# Patient Record
Sex: MALE | Race: WHITE | Employment: OTHER | URBAN - NONMETROPOLITAN AREA
[De-identification: names, ages, dates, MRNs, and addresses within clinical notes are randomized per-mention and may not be internally consistent; named-entity substitution may affect disease eponyms.]

---

## 2021-09-01 ENCOUNTER — ANESTHESIA EVENT (OUTPATIENT)
Dept: ENDOSCOPY | Age: 76
DRG: 872 | End: 2021-09-01
Payer: MEDICARE

## 2021-09-01 ENCOUNTER — APPOINTMENT (OUTPATIENT)
Dept: GENERAL RADIOLOGY | Age: 76
DRG: 872 | End: 2021-09-01
Attending: HOSPITALIST
Payer: MEDICARE

## 2021-09-01 ENCOUNTER — HOSPITAL ENCOUNTER (INPATIENT)
Age: 76
LOS: 2 days | Discharge: HOME | DRG: 872 | End: 2021-09-03
Attending: HOSPITALIST | Admitting: FAMILY MEDICINE
Payer: MEDICARE

## 2021-09-01 ENCOUNTER — ANESTHESIA (OUTPATIENT)
Dept: ENDOSCOPY | Age: 76
DRG: 872 | End: 2021-09-01
Payer: MEDICARE

## 2021-09-01 VITALS — OXYGEN SATURATION: 90 % | DIASTOLIC BLOOD PRESSURE: 73 MMHG | SYSTOLIC BLOOD PRESSURE: 142 MMHG | TEMPERATURE: 98 F

## 2021-09-01 PROBLEM — K83.1 COMMON BILE DUCT (CBD) OBSTRUCTION: Status: ACTIVE | Noted: 2021-09-01

## 2021-09-01 LAB
ALBUMIN SERPL-MCNC: 3.9 G/DL (ref 3.5–5.2)
ALP BLD-CCNC: 134 U/L (ref 40–130)
ALT SERPL-CCNC: 455 U/L (ref 5–41)
ANION GAP SERPL CALCULATED.3IONS-SCNC: 14 MMOL/L (ref 7–19)
AST SERPL-CCNC: 404 U/L (ref 5–40)
BILIRUB SERPL-MCNC: 7.1 MG/DL (ref 0.2–1.2)
BUN BLDV-MCNC: 16 MG/DL (ref 8–23)
CALCIUM SERPL-MCNC: 9.4 MG/DL (ref 8.8–10.2)
CHLORIDE BLD-SCNC: 101 MMOL/L (ref 98–111)
CO2: 25 MMOL/L (ref 22–29)
CREAT SERPL-MCNC: 0.9 MG/DL (ref 0.5–1.2)
GFR AFRICAN AMERICAN: >59
GFR NON-AFRICAN AMERICAN: >60
GLUCOSE BLD-MCNC: 110 MG/DL (ref 74–109)
HCT VFR BLD CALC: 45.1 % (ref 42–52)
HEMOGLOBIN: 14.6 G/DL (ref 14–18)
INR BLD: 1.28 (ref 0.88–1.18)
LACTIC ACID: 2.2 MMOL/L (ref 0.5–1.9)
LV EF: 65 %
LVEF MODALITY: NORMAL
MCH RBC QN AUTO: 28 PG (ref 27–31)
MCHC RBC AUTO-ENTMCNC: 32.4 G/DL (ref 33–37)
MCV RBC AUTO: 86.6 FL (ref 80–94)
PDW BLD-RTO: 15.1 % (ref 11.5–14.5)
PLATELET # BLD: 136 K/UL (ref 130–400)
PMV BLD AUTO: 11.4 FL (ref 9.4–12.4)
POTASSIUM SERPL-SCNC: 4.1 MMOL/L (ref 3.5–5)
PRO-BNP: 1927 PG/ML (ref 0–1800)
PROTHROMBIN TIME: 16.1 SEC (ref 12–14.6)
RBC # BLD: 5.21 M/UL (ref 4.7–6.1)
SODIUM BLD-SCNC: 140 MMOL/L (ref 136–145)
TOTAL PROTEIN: 6.8 G/DL (ref 6.6–8.7)
TSH SERPL DL<=0.05 MIU/L-ACNC: 2.03 UIU/ML (ref 0.27–4.2)
WBC # BLD: 20.1 K/UL (ref 4.8–10.8)

## 2021-09-01 PROCEDURE — 6360000002 HC RX W HCPCS: Performed by: FAMILY MEDICINE

## 2021-09-01 PROCEDURE — 87040 BLOOD CULTURE FOR BACTERIA: CPT

## 2021-09-01 PROCEDURE — 6360000002 HC RX W HCPCS: Performed by: NURSE ANESTHETIST, CERTIFIED REGISTERED

## 2021-09-01 PROCEDURE — 2580000003 HC RX 258: Performed by: INTERNAL MEDICINE

## 2021-09-01 PROCEDURE — 3700000001 HC ADD 15 MINUTES (ANESTHESIA): Performed by: INTERNAL MEDICINE

## 2021-09-01 PROCEDURE — 85027 COMPLETE CBC AUTOMATED: CPT

## 2021-09-01 PROCEDURE — 6360000004 HC RX CONTRAST MEDICATION: Performed by: FAMILY MEDICINE

## 2021-09-01 PROCEDURE — 99254 IP/OBS CNSLTJ NEW/EST MOD 60: CPT | Performed by: INTERNAL MEDICINE

## 2021-09-01 PROCEDURE — C1769 GUIDE WIRE: HCPCS | Performed by: INTERNAL MEDICINE

## 2021-09-01 PROCEDURE — 2580000003 HC RX 258: Performed by: NURSE ANESTHETIST, CERTIFIED REGISTERED

## 2021-09-01 PROCEDURE — 2709999900 HC NON-CHARGEABLE SUPPLY: Performed by: INTERNAL MEDICINE

## 2021-09-01 PROCEDURE — 6360000002 HC RX W HCPCS: Performed by: INTERNAL MEDICINE

## 2021-09-01 PROCEDURE — 2580000003 HC RX 258: Performed by: FAMILY MEDICINE

## 2021-09-01 PROCEDURE — 84443 ASSAY THYROID STIM HORMONE: CPT

## 2021-09-01 PROCEDURE — 3609015200 HC ERCP REMOVE CALCULI/DEBRIS BILIARY/PANCREAS DUCT: Performed by: INTERNAL MEDICINE

## 2021-09-01 PROCEDURE — 83880 ASSAY OF NATRIURETIC PEPTIDE: CPT

## 2021-09-01 PROCEDURE — 85610 PROTHROMBIN TIME: CPT

## 2021-09-01 PROCEDURE — 6370000000 HC RX 637 (ALT 250 FOR IP): Performed by: INTERNAL MEDICINE

## 2021-09-01 PROCEDURE — 2500000003 HC RX 250 WO HCPCS: Performed by: NURSE ANESTHETIST, CERTIFIED REGISTERED

## 2021-09-01 PROCEDURE — 2720000010 HC SURG SUPPLY STERILE: Performed by: INTERNAL MEDICINE

## 2021-09-01 PROCEDURE — 0F7D8DZ DILATION OF PANCREATIC DUCT WITH INTRALUMINAL DEVICE, VIA NATURAL OR ARTIFICIAL OPENING ENDOSCOPIC: ICD-10-PCS | Performed by: INTERNAL MEDICINE

## 2021-09-01 PROCEDURE — 7100000001 HC PACU RECOVERY - ADDTL 15 MIN: Performed by: INTERNAL MEDICINE

## 2021-09-01 PROCEDURE — 0F798DZ DILATION OF COMMON BILE DUCT WITH INTRALUMINAL DEVICE, VIA NATURAL OR ARTIFICIAL OPENING ENDOSCOPIC: ICD-10-PCS | Performed by: INTERNAL MEDICINE

## 2021-09-01 PROCEDURE — 7100000000 HC PACU RECOVERY - FIRST 15 MIN: Performed by: INTERNAL MEDICINE

## 2021-09-01 PROCEDURE — 3700000000 HC ANESTHESIA ATTENDED CARE: Performed by: INTERNAL MEDICINE

## 2021-09-01 PROCEDURE — 74328 X-RAY BILE DUCT ENDOSCOPY: CPT

## 2021-09-01 PROCEDURE — 83605 ASSAY OF LACTIC ACID: CPT

## 2021-09-01 PROCEDURE — 2500000003 HC RX 250 WO HCPCS: Performed by: FAMILY MEDICINE

## 2021-09-01 PROCEDURE — C8929 TTE W OR WO FOL WCON,DOPPLER: HCPCS

## 2021-09-01 PROCEDURE — 36415 COLL VENOUS BLD VENIPUNCTURE: CPT

## 2021-09-01 PROCEDURE — 80053 COMPREHEN METABOLIC PANEL: CPT

## 2021-09-01 PROCEDURE — 2140000000 HC CCU INTERMEDIATE R&B

## 2021-09-01 PROCEDURE — 0FC98ZZ EXTIRPATION OF MATTER FROM COMMON BILE DUCT, VIA NATURAL OR ARTIFICIAL OPENING ENDOSCOPIC: ICD-10-PCS | Performed by: INTERNAL MEDICINE

## 2021-09-01 PROCEDURE — C2625 STENT, NON-COR, TEM W/DEL SY: HCPCS | Performed by: INTERNAL MEDICINE

## 2021-09-01 DEVICE — PANCREATIC STENT KIT
Type: IMPLANTABLE DEVICE | Site: PANCREAS | Status: FUNCTIONAL
Brand: ADVANIX™ PANCREATIC STENT KIT

## 2021-09-01 DEVICE — BILIARY STENT WITH NAVIFLEXTM RX DELIVERY SYSTEM
Type: IMPLANTABLE DEVICE | Site: BILE DUCT | Status: FUNCTIONAL
Brand: ADVANIX™ BILIARY

## 2021-09-01 RX ORDER — SODIUM CHLORIDE 0.9 % (FLUSH) 0.9 %
10 SYRINGE (ML) INJECTION EVERY 12 HOURS SCHEDULED
Status: DISCONTINUED | OUTPATIENT
Start: 2021-09-01 | End: 2021-09-03 | Stop reason: HOSPADM

## 2021-09-01 RX ORDER — MEPERIDINE HYDROCHLORIDE 25 MG/ML
12.5 INJECTION INTRAMUSCULAR; INTRAVENOUS; SUBCUTANEOUS EVERY 5 MIN PRN
Status: DISCONTINUED | OUTPATIENT
Start: 2021-09-01 | End: 2021-09-01

## 2021-09-01 RX ORDER — ONDANSETRON 2 MG/ML
INJECTION INTRAMUSCULAR; INTRAVENOUS PRN
Status: DISCONTINUED | OUTPATIENT
Start: 2021-09-01 | End: 2021-09-01 | Stop reason: SDUPTHER

## 2021-09-01 RX ORDER — HYDROMORPHONE HYDROCHLORIDE 1 MG/ML
1 INJECTION, SOLUTION INTRAMUSCULAR; INTRAVENOUS; SUBCUTANEOUS EVERY 4 HOURS PRN
Status: DISCONTINUED | OUTPATIENT
Start: 2021-09-01 | End: 2021-09-03 | Stop reason: HOSPADM

## 2021-09-01 RX ORDER — IBUPROFEN 400 MG/1
400 TABLET ORAL EVERY 6 HOURS PRN
Status: DISCONTINUED | OUTPATIENT
Start: 2021-09-01 | End: 2021-09-03 | Stop reason: HOSPADM

## 2021-09-01 RX ORDER — DILTIAZEM HYDROCHLORIDE 240 MG/1
240 CAPSULE, COATED, EXTENDED RELEASE ORAL DAILY
COMMUNITY

## 2021-09-01 RX ORDER — ROCURONIUM BROMIDE 10 MG/ML
INJECTION, SOLUTION INTRAVENOUS PRN
Status: DISCONTINUED | OUTPATIENT
Start: 2021-09-01 | End: 2021-09-01 | Stop reason: SDUPTHER

## 2021-09-01 RX ORDER — POLYETHYLENE GLYCOL 3350 17 G/17G
17 POWDER, FOR SOLUTION ORAL DAILY PRN
Status: DISCONTINUED | OUTPATIENT
Start: 2021-09-01 | End: 2021-09-03 | Stop reason: HOSPADM

## 2021-09-01 RX ORDER — LOSARTAN POTASSIUM 50 MG/1
50 TABLET ORAL DAILY
COMMUNITY

## 2021-09-01 RX ORDER — MAGNESIUM SULFATE IN WATER 40 MG/ML
2000 INJECTION, SOLUTION INTRAVENOUS PRN
Status: DISCONTINUED | OUTPATIENT
Start: 2021-09-01 | End: 2021-09-03 | Stop reason: HOSPADM

## 2021-09-01 RX ORDER — ACETAMINOPHEN 325 MG/1
650 TABLET ORAL EVERY 6 HOURS PRN
Status: DISCONTINUED | OUTPATIENT
Start: 2021-09-01 | End: 2021-09-01

## 2021-09-01 RX ORDER — POTASSIUM CHLORIDE 7.45 MG/ML
10 INJECTION INTRAVENOUS PRN
Status: DISCONTINUED | OUTPATIENT
Start: 2021-09-01 | End: 2021-09-03 | Stop reason: HOSPADM

## 2021-09-01 RX ORDER — ACETAMINOPHEN 650 MG/1
650 SUPPOSITORY RECTAL EVERY 6 HOURS PRN
Status: DISCONTINUED | OUTPATIENT
Start: 2021-09-01 | End: 2021-09-01

## 2021-09-01 RX ORDER — HYDROMORPHONE HYDROCHLORIDE 1 MG/ML
0.5 INJECTION, SOLUTION INTRAMUSCULAR; INTRAVENOUS; SUBCUTANEOUS EVERY 5 MIN PRN
Status: DISCONTINUED | OUTPATIENT
Start: 2021-09-01 | End: 2021-09-01

## 2021-09-01 RX ORDER — OMEPRAZOLE 20 MG/1
40 CAPSULE, DELAYED RELEASE ORAL DAILY
COMMUNITY

## 2021-09-01 RX ORDER — SODIUM CHLORIDE 0.9 % (FLUSH) 0.9 %
5-40 SYRINGE (ML) INJECTION EVERY 12 HOURS SCHEDULED
Status: DISCONTINUED | OUTPATIENT
Start: 2021-09-01 | End: 2021-09-01

## 2021-09-01 RX ORDER — DEXAMETHASONE SODIUM PHOSPHATE 10 MG/ML
INJECTION, SOLUTION INTRAMUSCULAR; INTRAVENOUS PRN
Status: DISCONTINUED | OUTPATIENT
Start: 2021-09-01 | End: 2021-09-01 | Stop reason: SDUPTHER

## 2021-09-01 RX ORDER — LABETALOL HYDROCHLORIDE 5 MG/ML
5 INJECTION, SOLUTION INTRAVENOUS EVERY 10 MIN PRN
Status: DISCONTINUED | OUTPATIENT
Start: 2021-09-01 | End: 2021-09-01

## 2021-09-01 RX ORDER — METOCLOPRAMIDE HYDROCHLORIDE 5 MG/ML
10 INJECTION INTRAMUSCULAR; INTRAVENOUS
Status: DISCONTINUED | OUTPATIENT
Start: 2021-09-01 | End: 2021-09-01

## 2021-09-01 RX ORDER — SODIUM CHLORIDE 0.9 % (FLUSH) 0.9 %
5-40 SYRINGE (ML) INJECTION PRN
Status: DISCONTINUED | OUTPATIENT
Start: 2021-09-01 | End: 2021-09-01

## 2021-09-01 RX ORDER — ONDANSETRON 4 MG/1
4 TABLET, ORALLY DISINTEGRATING ORAL EVERY 8 HOURS PRN
Status: DISCONTINUED | OUTPATIENT
Start: 2021-09-01 | End: 2021-09-03 | Stop reason: HOSPADM

## 2021-09-01 RX ORDER — MECOBALAMIN 5000 MCG
5 TABLET,DISINTEGRATING ORAL NIGHTLY
Status: DISCONTINUED | OUTPATIENT
Start: 2021-09-01 | End: 2021-09-03 | Stop reason: HOSPADM

## 2021-09-01 RX ORDER — LIDOCAINE HYDROCHLORIDE 10 MG/ML
1 INJECTION, SOLUTION EPIDURAL; INFILTRATION; INTRACAUDAL; PERINEURAL
Status: DISCONTINUED | OUTPATIENT
Start: 2021-09-01 | End: 2021-09-01

## 2021-09-01 RX ORDER — SODIUM CHLORIDE 9 MG/ML
25 INJECTION, SOLUTION INTRAVENOUS PRN
Status: DISCONTINUED | OUTPATIENT
Start: 2021-09-01 | End: 2021-09-03 | Stop reason: HOSPADM

## 2021-09-01 RX ORDER — LIDOCAINE HYDROCHLORIDE 10 MG/ML
INJECTION, SOLUTION EPIDURAL; INFILTRATION; INTRACAUDAL; PERINEURAL PRN
Status: DISCONTINUED | OUTPATIENT
Start: 2021-09-01 | End: 2021-09-01 | Stop reason: SDUPTHER

## 2021-09-01 RX ORDER — MORPHINE SULFATE 4 MG/ML
4 INJECTION, SOLUTION INTRAMUSCULAR; INTRAVENOUS
Status: DISCONTINUED | OUTPATIENT
Start: 2021-09-01 | End: 2021-09-01

## 2021-09-01 RX ORDER — SODIUM CHLORIDE 9 MG/ML
INJECTION, SOLUTION INTRAVENOUS CONTINUOUS
Status: ACTIVE | OUTPATIENT
Start: 2021-09-01 | End: 2021-09-01

## 2021-09-01 RX ORDER — MORPHINE SULFATE 4 MG/ML
4 INJECTION, SOLUTION INTRAMUSCULAR; INTRAVENOUS EVERY 5 MIN PRN
Status: DISCONTINUED | OUTPATIENT
Start: 2021-09-01 | End: 2021-09-01

## 2021-09-01 RX ORDER — SODIUM CHLORIDE 0.9 % (FLUSH) 0.9 %
10 SYRINGE (ML) INJECTION PRN
Status: DISCONTINUED | OUTPATIENT
Start: 2021-09-01 | End: 2021-09-03 | Stop reason: HOSPADM

## 2021-09-01 RX ORDER — PANTOPRAZOLE SODIUM 40 MG/10ML
40 INJECTION, POWDER, LYOPHILIZED, FOR SOLUTION INTRAVENOUS DAILY
Status: DISCONTINUED | OUTPATIENT
Start: 2021-09-01 | End: 2021-09-03 | Stop reason: HOSPADM

## 2021-09-01 RX ORDER — MIDAZOLAM HYDROCHLORIDE 1 MG/ML
2 INJECTION INTRAMUSCULAR; INTRAVENOUS
Status: DISCONTINUED | OUTPATIENT
Start: 2021-09-01 | End: 2021-09-01

## 2021-09-01 RX ORDER — PROPOFOL 10 MG/ML
INJECTION, EMULSION INTRAVENOUS PRN
Status: DISCONTINUED | OUTPATIENT
Start: 2021-09-01 | End: 2021-09-01 | Stop reason: SDUPTHER

## 2021-09-01 RX ORDER — POTASSIUM CHLORIDE 20 MEQ/1
40 TABLET, EXTENDED RELEASE ORAL PRN
Status: DISCONTINUED | OUTPATIENT
Start: 2021-09-01 | End: 2021-09-03 | Stop reason: HOSPADM

## 2021-09-01 RX ORDER — FENTANYL CITRATE 50 UG/ML
INJECTION, SOLUTION INTRAMUSCULAR; INTRAVENOUS PRN
Status: DISCONTINUED | OUTPATIENT
Start: 2021-09-01 | End: 2021-09-01 | Stop reason: SDUPTHER

## 2021-09-01 RX ORDER — PROMETHAZINE HYDROCHLORIDE 25 MG/ML
6.25 INJECTION, SOLUTION INTRAMUSCULAR; INTRAVENOUS
Status: DISCONTINUED | OUTPATIENT
Start: 2021-09-01 | End: 2021-09-01

## 2021-09-01 RX ORDER — SODIUM CHLORIDE, SODIUM LACTATE, POTASSIUM CHLORIDE, CALCIUM CHLORIDE 600; 310; 30; 20 MG/100ML; MG/100ML; MG/100ML; MG/100ML
INJECTION, SOLUTION INTRAVENOUS CONTINUOUS
Status: DISCONTINUED | OUTPATIENT
Start: 2021-09-01 | End: 2021-09-01

## 2021-09-01 RX ORDER — M-VIT,TX,IRON,MINS/CALC/FOLIC 27MG-0.4MG
1 TABLET ORAL DAILY
COMMUNITY

## 2021-09-01 RX ORDER — SODIUM CHLORIDE 0.9 % (FLUSH) 0.9 %
5-40 SYRINGE (ML) INJECTION EVERY 12 HOURS SCHEDULED
Status: DISCONTINUED | OUTPATIENT
Start: 2021-09-01 | End: 2021-09-03 | Stop reason: HOSPADM

## 2021-09-01 RX ORDER — DIPHENHYDRAMINE HYDROCHLORIDE 50 MG/ML
12.5 INJECTION INTRAMUSCULAR; INTRAVENOUS
Status: DISCONTINUED | OUTPATIENT
Start: 2021-09-01 | End: 2021-09-01

## 2021-09-01 RX ORDER — SCOLOPAMINE TRANSDERMAL SYSTEM 1 MG/1
1 PATCH, EXTENDED RELEASE TRANSDERMAL ONCE
Status: DISCONTINUED | OUTPATIENT
Start: 2021-09-01 | End: 2021-09-01

## 2021-09-01 RX ORDER — COLCHICINE 0.6 MG/1
0.6 TABLET ORAL DAILY PRN
COMMUNITY

## 2021-09-01 RX ORDER — ALLOPURINOL 100 MG/1
100 TABLET ORAL DAILY
COMMUNITY

## 2021-09-01 RX ORDER — ONDANSETRON 2 MG/ML
4 INJECTION INTRAMUSCULAR; INTRAVENOUS EVERY 6 HOURS PRN
Status: DISCONTINUED | OUTPATIENT
Start: 2021-09-01 | End: 2021-09-03 | Stop reason: HOSPADM

## 2021-09-01 RX ORDER — SODIUM CHLORIDE 9 MG/ML
10 INJECTION INTRAVENOUS DAILY
Status: DISCONTINUED | OUTPATIENT
Start: 2021-09-01 | End: 2021-09-03 | Stop reason: HOSPADM

## 2021-09-01 RX ORDER — DILTIAZEM HYDROCHLORIDE 5 MG/ML
10 INJECTION INTRAVENOUS ONCE
Status: COMPLETED | OUTPATIENT
Start: 2021-09-01 | End: 2021-09-01

## 2021-09-01 RX ORDER — HYDRALAZINE HYDROCHLORIDE 20 MG/ML
5 INJECTION INTRAMUSCULAR; INTRAVENOUS EVERY 10 MIN PRN
Status: DISCONTINUED | OUTPATIENT
Start: 2021-09-01 | End: 2021-09-01

## 2021-09-01 RX ORDER — SODIUM CHLORIDE, SODIUM LACTATE, POTASSIUM CHLORIDE, CALCIUM CHLORIDE 600; 310; 30; 20 MG/100ML; MG/100ML; MG/100ML; MG/100ML
INJECTION, SOLUTION INTRAVENOUS CONTINUOUS PRN
Status: DISCONTINUED | OUTPATIENT
Start: 2021-09-01 | End: 2021-09-01 | Stop reason: SDUPTHER

## 2021-09-01 RX ORDER — FENTANYL CITRATE 50 UG/ML
50 INJECTION, SOLUTION INTRAMUSCULAR; INTRAVENOUS
Status: DISCONTINUED | OUTPATIENT
Start: 2021-09-01 | End: 2021-09-01

## 2021-09-01 RX ORDER — SODIUM CHLORIDE 0.9 % (FLUSH) 0.9 %
5-40 SYRINGE (ML) INJECTION PRN
Status: DISCONTINUED | OUTPATIENT
Start: 2021-09-01 | End: 2021-09-03 | Stop reason: HOSPADM

## 2021-09-01 RX ORDER — SUCCINYLCHOLINE CHLORIDE 20 MG/ML
INJECTION INTRAMUSCULAR; INTRAVENOUS PRN
Status: DISCONTINUED | OUTPATIENT
Start: 2021-09-01 | End: 2021-09-01 | Stop reason: SDUPTHER

## 2021-09-01 RX ORDER — MORPHINE SULFATE 4 MG/ML
2 INJECTION, SOLUTION INTRAMUSCULAR; INTRAVENOUS EVERY 5 MIN PRN
Status: DISCONTINUED | OUTPATIENT
Start: 2021-09-01 | End: 2021-09-01

## 2021-09-01 RX ORDER — HYDROMORPHONE HYDROCHLORIDE 1 MG/ML
0.25 INJECTION, SOLUTION INTRAMUSCULAR; INTRAVENOUS; SUBCUTANEOUS EVERY 5 MIN PRN
Status: DISCONTINUED | OUTPATIENT
Start: 2021-09-01 | End: 2021-09-01

## 2021-09-01 RX ORDER — SODIUM CHLORIDE 9 MG/ML
25 INJECTION, SOLUTION INTRAVENOUS PRN
Status: DISCONTINUED | OUTPATIENT
Start: 2021-09-01 | End: 2021-09-01

## 2021-09-01 RX ADMIN — GLUCAGON HYDROCHLORIDE 1 MG: 1 INJECTION, POWDER, FOR SOLUTION INTRAMUSCULAR; INTRAVENOUS; SUBCUTANEOUS at 17:12

## 2021-09-01 RX ADMIN — PHENYLEPHRINE HYDROCHLORIDE 100 MCG: 10 INJECTION INTRAVENOUS at 17:19

## 2021-09-01 RX ADMIN — PIPERACILLIN AND TAZOBACTAM 3375 MG: 3; .375 INJECTION, POWDER, LYOPHILIZED, FOR SOLUTION INTRAVENOUS at 22:45

## 2021-09-01 RX ADMIN — PHENYLEPHRINE HYDROCHLORIDE 100 MCG: 10 INJECTION INTRAVENOUS at 17:35

## 2021-09-01 RX ADMIN — PHENYLEPHRINE HYDROCHLORIDE 100 MCG: 10 INJECTION INTRAVENOUS at 17:42

## 2021-09-01 RX ADMIN — PROPOFOL 180 MG: 10 INJECTION, EMULSION INTRAVENOUS at 16:47

## 2021-09-01 RX ADMIN — SUCCINYLCHOLINE CHLORIDE 120 MG: 20 INJECTION, SOLUTION INTRAMUSCULAR; INTRAVENOUS at 16:48

## 2021-09-01 RX ADMIN — ONDANSETRON HYDROCHLORIDE 4 MG: 2 INJECTION, SOLUTION INTRAMUSCULAR; INTRAVENOUS at 17:20

## 2021-09-01 RX ADMIN — FENTANYL CITRATE 100 MCG: 50 INJECTION INTRAMUSCULAR; INTRAVENOUS at 17:15

## 2021-09-01 RX ADMIN — LIDOCAINE HYDROCHLORIDE 50 MG: 10 INJECTION, SOLUTION EPIDURAL; INFILTRATION; INTRACAUDAL; PERINEURAL at 16:46

## 2021-09-01 RX ADMIN — DILTIAZEM HYDROCHLORIDE 10 MG: 5 INJECTION INTRAVENOUS at 11:42

## 2021-09-01 RX ADMIN — FENTANYL CITRATE 100 MCG: 50 INJECTION INTRAMUSCULAR; INTRAVENOUS at 16:46

## 2021-09-01 RX ADMIN — PHENYLEPHRINE HYDROCHLORIDE 100 MCG: 10 INJECTION INTRAVENOUS at 17:32

## 2021-09-01 RX ADMIN — PHENYLEPHRINE HYDROCHLORIDE 100 MCG: 10 INJECTION INTRAVENOUS at 17:02

## 2021-09-01 RX ADMIN — PHENYLEPHRINE HYDROCHLORIDE 100 MCG: 10 INJECTION INTRAVENOUS at 17:04

## 2021-09-01 RX ADMIN — PHENYLEPHRINE HYDROCHLORIDE 100 MCG: 10 INJECTION INTRAVENOUS at 17:39

## 2021-09-01 RX ADMIN — DILTIAZEM HYDROCHLORIDE 10 MG/HR: 5 INJECTION INTRAVENOUS at 11:45

## 2021-09-01 RX ADMIN — SUGAMMADEX 200 MG: 100 INJECTION, SOLUTION INTRAVENOUS at 18:37

## 2021-09-01 RX ADMIN — GLUCAGON HYDROCHLORIDE 1 MG: 1 INJECTION, POWDER, FOR SOLUTION INTRAMUSCULAR; INTRAVENOUS; SUBCUTANEOUS at 17:27

## 2021-09-01 RX ADMIN — ROCURONIUM BROMIDE 20 MG: 10 INJECTION, SOLUTION INTRAVENOUS at 17:37

## 2021-09-01 RX ADMIN — PERFLUTREN 1.65 MG: 6.52 INJECTION, SUSPENSION INTRAVENOUS at 15:00

## 2021-09-01 RX ADMIN — PHENYLEPHRINE HYDROCHLORIDE 150 MCG/MIN: 10 INJECTION INTRAVENOUS at 17:46

## 2021-09-01 RX ADMIN — DEXAMETHASONE SODIUM PHOSPHATE 4 MG: 10 INJECTION, SOLUTION INTRAMUSCULAR; INTRAVENOUS at 16:55

## 2021-09-01 RX ADMIN — SODIUM CHLORIDE, SODIUM LACTATE, POTASSIUM CHLORIDE, AND CALCIUM CHLORIDE: 600; 310; 30; 20 INJECTION, SOLUTION INTRAVENOUS at 17:54

## 2021-09-01 RX ADMIN — SODIUM CHLORIDE, PRESERVATIVE FREE 10 ML: 5 INJECTION INTRAVENOUS at 22:45

## 2021-09-01 RX ADMIN — Medication 5 MG: at 22:44

## 2021-09-01 RX ADMIN — SODIUM CHLORIDE, SODIUM LACTATE, POTASSIUM CHLORIDE, AND CALCIUM CHLORIDE: 600; 310; 30; 20 INJECTION, SOLUTION INTRAVENOUS at 16:42

## 2021-09-01 RX ADMIN — ENOXAPARIN SODIUM 40 MG: 40 INJECTION SUBCUTANEOUS at 11:43

## 2021-09-01 RX ADMIN — PHENYLEPHRINE HYDROCHLORIDE 100 MCG: 10 INJECTION INTRAVENOUS at 17:41

## 2021-09-01 ASSESSMENT — ENCOUNTER SYMPTOMS
DIARRHEA: 0
TROUBLE SWALLOWING: 0
COUGH: 0
WHEEZING: 0
SORE THROAT: 0
VOMITING: 0
EYES NEGATIVE: 1
ABDOMINAL PAIN: 1
COLOR CHANGE: 0
CHOKING: 0
SHORTNESS OF BREATH: 0
NAUSEA: 1
CONSTIPATION: 0
SHORTNESS OF BREATH: 0
ALLERGIC/IMMUNOLOGIC NEGATIVE: 1
NAUSEA: 0

## 2021-09-01 ASSESSMENT — LIFESTYLE VARIABLES: SMOKING_STATUS: 0

## 2021-09-01 NOTE — H&P
HISTORY AND PHYSICAL             Date: 9/2/2021        Patient Name: Mickey Garcia     YOB: 1945      Age:  67 y.o. Chief Complaint   Abdominal pain, transfer from [facility]       History Obtained From   patient, electronic medical record    History of Present Illness   Patient is a 69-year-old  male with a known past medical history of atrial fibrillation, essential hypertension, gastroesophageal reflux disease, gout transferred from [facility] to PCU due to abdominal discomfort, CT abdomen and pelvis with abnormal dilation of her common bile duct worrisome for biliary obstruction as well as atrial fibrillation with rapid ventricular rate. Patient seen in PCU bed 729, in no acute distress, feels as though his abdomen is less bloated. States initial onset of pain occurred while working at [employer] after a meal which consisted of bologna and cheese. Patient denies any prior episodes of this pain. Labs at [facility] revealed white count of 16.8, with some bands elevation. Metabolic profile revealing , , alkaline phosphatase 143, total bilirubin 2.7. At current time patient denies any headache, change in vision, chest pain, fevers, diarrhea. Patient does complain of slight nauseousness. Covid screen was noted to be - 9/1/2021      Of note CT abdomen and pelvis report as well as CD accompany patient in chart. CT abdomen and pelvis -abnormal dilation of the common bile duct with mild intrahepatic bile duct dilation worrisome for biliary obstruction. Extrahepatic bile duct abnormality dilated to 1.3 cm. Recommending contrast-enhanced MRCP  Prostate mildly large, evidence of diverticulosis without diverticulitis. Gallbladdernormal in appearance.     Past Medical History     Past Medical History:   Diagnosis Date    Arthritis     Chronic kidney disease     COPD (chronic obstructive pulmonary disease) ([facility] [address]     GERD (gastroesophageal Gastrointestinal: Positive for abdominal pain and nausea. Negative for diarrhea and vomiting. Genitourinary: Negative for difficulty urinating. Musculoskeletal: Negative for arthralgias. Skin: Negative for color change and pallor. Neurological: Negative for weakness, light-headedness and headaches. Psychiatric/Behavioral: Negative for agitation and confusion. Physical Exam   /76   Pulse 96   Temp 97.7 °F (36.5 °C) (Temporal)   Resp 18   Ht 5' 10\" (1.778 m)   Wt 198 lb 4 oz (89.9 kg)   SpO2 98%   BMI 28.45 kg/m²     Physical Exam  Vitals and nursing note reviewed. Constitutional:       General: He is not in acute distress. Appearance: He is obese. HENT:      Head: Normocephalic and atraumatic. Nose: Nose normal.   Eyes:      Conjunctiva/sclera: Conjunctivae normal.   Cardiovascular:      Rate and Rhythm: Tachycardia present. Rhythm irregularly irregular. Pulses: Normal pulses. Pulmonary:      Effort: No respiratory distress. Breath sounds: No wheezing. Abdominal:      General: Bowel sounds are normal. There is distension. Tenderness: There is abdominal tenderness. There is no guarding or rebound. Musculoskeletal:      Cervical back: Normal range of motion. Right lower leg: No edema. Left lower leg: No edema. Skin:     General: Skin is warm. Neurological:      Mental Status: He is alert and oriented to person, place, and time. Mental status is at baseline.    Psychiatric:         Mood and Affect: Mood normal.         Labs      Recent Results (from the past 24 hour(s))   TSH without Reflex    Collection Time: 09/01/21 11:26 AM   Result Value Ref Range    TSH 2.030 0.270 - 4.200 uIU/mL   Protime-INR    Collection Time: 09/01/21 11:26 AM   Result Value Ref Range    Protime 16.1 (H) 12.0 - 14.6 sec    INR 1.28 (H) 0.88 - 1.18   CBC    Collection Time: 09/01/21 11:26 AM   Result Value Ref Range    WBC 20.1 (H) 4.8 - 10.8 K/uL    RBC 5.21 4.70 - 6.10 M/uL    Hemoglobin 14.6 14.0 - 18.0 g/dL    Hematocrit 45.1 42.0 - 52.0 %    MCV 86.6 80.0 - 94.0 fL    MCH 28.0 27.0 - 31.0 pg    MCHC 32.4 (L) 33.0 - 37.0 g/dL    RDW 15.1 (H) 11.5 - 14.5 %    Platelets 396 354 - 565 K/uL    MPV 11.4 9.4 - 12.4 fL   Comprehensive Metabolic Panel    Collection Time: 09/01/21 11:26 AM   Result Value Ref Range    Sodium 140 136 - 145 mmol/L    Potassium 4.1 3.5 - 5.0 mmol/L    Chloride 101 98 - 111 mmol/L    CO2 25 22 - 29 mmol/L    Anion Gap 14 7 - 19 mmol/L    Glucose 110 (H) 74 - 109 mg/dL    BUN 16 8 - 23 mg/dL    CREATININE 0.9 0.5 - 1.2 mg/dL    GFR Non-African American >60 >60    GFR African American >59 >59    Calcium 9.4 8.8 - 10.2 mg/dL    Total Protein 6.8 6.6 - 8.7 g/dL    Albumin 3.9 3.5 - 5.2 g/dL    Total Bilirubin 7.1 (H) 0.2 - 1.2 mg/dL    Alkaline Phosphatase 134 (H) 40 - 130 U/L     (H) 5 - 41 U/L     (H) 5 - 40 U/L   PROBNP, N-TERMINAL    Collection Time: 09/01/21 11:26 AM   Result Value Ref Range    Pro-BNP 1,927 (H) 0 - 1,800 pg/mL   Lactic Acid, Plasma    Collection Time: 09/01/21  1:20 PM   Result Value Ref Range    Lactic Acid 2.2 (HH) 0.5 - 1.9 mmol/L   Lactic Acid, Plasma    Collection Time: 09/02/21  2:07 AM   Result Value Ref Range    Lactic Acid 1.6 0.5 - 1.9 mmol/L   CBC    Collection Time: 09/02/21  2:07 AM   Result Value Ref Range    WBC 16.8 (H) 4.8 - 10.8 K/uL    RBC 4.71 4.70 - 6.10 M/uL    Hemoglobin 13.1 (L) 14.0 - 18.0 g/dL    Hematocrit 40.8 (L) 42.0 - 52.0 %    MCV 86.6 80.0 - 94.0 fL    MCH 27.8 27.0 - 31.0 pg    MCHC 32.1 (L) 33.0 - 37.0 g/dL    RDW 15.4 (H) 11.5 - 14.5 %    Platelets 232 (L) 126 - 400 K/uL    MPV 11.9 9.4 - 12.4 fL   Comprehensive Metabolic Panel    Collection Time: 09/02/21  2:07 AM   Result Value Ref Range    Sodium 135 (L) 136 - 145 mmol/L    Potassium 4.2 3.5 - 5.0 mmol/L    Chloride 102 98 - 111 mmol/L    CO2 22 22 - 29 mmol/L    Anion Gap 11 7 - 19 mmol/L    Glucose 170 (H) 74 - 109 mg/dL    BUN 21 8 - 23 mg/dL    CREATININE 1.3 (H) 0.5 - 1.2 mg/dL    GFR Non-African American 54 (A) >60    GFR African American >59 >59    Calcium 8.8 8.8 - 10.2 mg/dL    Total Protein 5.6 (L) 6.6 - 8.7 g/dL    Albumin 3.5 3.5 - 5.2 g/dL    Total Bilirubin 4.4 (H) 0.2 - 1.2 mg/dL    Alkaline Phosphatase 116 40 - 130 U/L     (H) 5 - 41 U/L     (H) 5 - 40 U/L        Imaging/Diagnostics Last 24 Hours   No results found. Assessment      Hospital Problems         Last Modified POA    Common bile duct (CBD) obstruction 9/1/2021 Yes          Plan     Sepsis due to CBD Obstruction   -Noted elevation of lactic acid, tachycardia, elevated WBC count  -Suspected common bile duct pathology  -Patient placed n.p.o., GI for possible intervention today  -Trend lactic acid, continue Zosyn antibiotics, blood cultures in process    Abdominal pain/common bile duct obstruction  -Transferred from outlying facility, noted abnormal CT abdomen and pelvis  -Elevated LFTs, bilirubin  -Continue with pain control modalities, antiemetics as needed  -Protonix  -Currently n.p.o., GI has been consulted appreciate recommendations      Atrial Fibrillation with rapid ventricular rate  -Continues on Cardizem rate controlled   -Anticoagulation currently held in anticipation of surgery. - Will continue to monitor on telemetry   -TSH in process  -Transthoracic echocardiogram ordered    Gastroesophageal reflux disease  -Continue PPI    Essential hypertension  -Chronic condition, continue antihypertensive regimen, routine vitals            Consultations Ordered:  IP CONSULT TO GI        EMR Dragon/Transcription disclaimer:   Much of this encounter note is an electronic transcription/translation of spoken language to printed text.  The electronic translation of spoken language may permit erroneous, or at times, nonsensical words or phrases to be inadvertently transcribed; although attempts have made to review the note for such errors, some may still exist.    Electronically signed by   Yoanna Bennett MD   Internal Medicine Hospitalist  On 9/2/2021  At 8:24 AM

## 2021-09-01 NOTE — PROGRESS NOTES
Pt now to echo on stretcher with transport. Pt is alert and oriented, very [employer] [place] [employer] [place].  Electronically signed by Chris Cam RN on 9/1/2021 at 2:27 PM

## 2021-09-01 NOTE — CONSULTS
Consult Note            Date:9/1/2021        Patient Name:Jeff Zamudio     YOB: 1945     Age:75 y.o. Inpatient consult to GI  Consult performed by: Abiel Martin MD  Consult ordered by: Chichi Daniels MD  Reason for consult: BIliary Obstruction          Chief Complaint   No chief complaint on file. History Obtained From   Patient and EMR    History of Present Illness   Mr. Mimi Guillaume is a pleasant 68 yo male who had acute onset of RUQ pain that started about 20 minutes after eating lunch yesterday. It was severe in nature and patient denies having had nausea or vomiting. He presented to his local ED due to the persistance and severity of pain and workup was remarkable for elevated WBC 15K, elevated AST and ALT with a total bilirubin of 2.7. CT scan was obtained and did show a dilated CBD of 13 mm with cut off at the level of the ampulla but no discrete stone was seen. Patient also noted to be in A fib with RVR which he does have history of but has not been on anticoaguation. He was transferred for further evaluation and possible ERCP. Patient today does feel like his pain is improved however he does have significant abdominal tenderness. His WBC count now is up to 20K with worsening of ,  Alt 455, and bilirubin now at 7.1. Patient currently on a Cardizem gtt for control of heart rate. He was started on IV antibiotics at the outside ED. Patient denies any prior history of abdominal pian or history of gallstones. He does take Omeprazole for GERD which has been stable.      Past Medical History     Past Medical History:   Diagnosis Date    Arthritis     Chronic kidney disease     COPD (chronic obstructive pulmonary disease) ([facility] [address]     GERD (gastroesophageal reflux disease)     Hypertension     Pneumonia         Past Surgical History     Past Surgical History:   Procedure Laterality Date    APPENDECTOMY      TONSILLECTOMY          Medications     Prior to Admission medications Medication Sig Start Date End Date Taking? Authorizing Provider   allopurinol (ZYLOPRIM) 100 MG tablet Take 100 mg by mouth daily   Yes Historical Provider, MD   losartan (COZAAR) 50 MG tablet Take 50 mg by mouth daily   Yes Historical Provider, MD   dilTIAZem (CARDIZEM CD) 240 MG extended release capsule Take 240 mg by mouth daily   Yes Historical Provider, MD   omeprazole (PRILOSEC) 20 MG delayed release capsule Take 40 mg by mouth daily   Yes Historical Provider, MD   colchicine (COLCRYS) 0.6 MG tablet Take 0.6 mg by mouth daily as needed for Pain   Yes Historical Provider, MD   Multiple Vitamins-Minerals (THERAPEUTIC MULTIVITAMIN-MINERALS) tablet Take 1 tablet by mouth daily   Yes Historical Provider, MD        sodium chloride flush 0.9 % injection 5-40 mL, 2 times per day  sodium chloride flush 0.9 % injection 5-40 mL, PRN  0.9 % sodium chloride infusion, PRN  dilTIAZem 125 mg in dextrose 5 % 125 mL infusion, Continuous  sodium chloride flush 0.9 % injection 5-40 mL, 2 times per day  sodium chloride flush 0.9 % injection 5-40 mL, PRN  0.9 % sodium chloride infusion, PRN  enoxaparin (LOVENOX) injection 40 mg, Daily  ondansetron (ZOFRAN-ODT) disintegrating tablet 4 mg, Q8H PRN   Or  ondansetron (ZOFRAN) injection 4 mg, Q6H PRN  polyethylene glycol (GLYCOLAX) packet 17 g, Daily PRN  acetaminophen (TYLENOL) tablet 650 mg, Q6H PRN   Or  acetaminophen (TYLENOL) suppository 650 mg, Q6H PRN  potassium chloride (KLOR-CON M) extended release tablet 40 mEq, PRN   Or  potassium bicarb-citric acid (EFFER-K) effervescent tablet 40 mEq, PRN   Or  potassium chloride 10 mEq/100 mL IVPB (Peripheral Line), PRN  magnesium sulfate 2000 mg in 50 mL IVPB premix, PRN  piperacillin-tazobactam (ZOSYN) 3,375 mg in dextrose 5 % 50 mL IVPB extended infusion (mini-bag), Q8H        Allergies   Patient has no known allergies.     Social History     Social History     Tobacco History     Smoking Status  Former Smoker Smoking Tobacco Type  Cigarettes    Smokeless Tobacco Use  Never Used    Tobacco Comment  quit smoking 30 yrs ago          Alcohol History     Alcohol Use Status  Not Currently          Drug Use     Drug Use Status  Not Currently          Sexual Activity     Sexually Active  Not Currently                Family History     Family History   Problem Relation Age of Onset    Cancer Mother     No Known Problems Sister        Review of Systems   Review of Systems   Constitutional: Negative for activity change, appetite change and unexpected weight change. HENT: Positive for hearing loss. Negative for trouble swallowing. Eyes: Negative. Respiratory: Negative for cough, choking and shortness of breath. Cardiovascular: Negative for chest pain and leg swelling. Gastrointestinal: Positive for abdominal pain. Negative for constipation, diarrhea, nausea and vomiting. Endocrine: Negative. Genitourinary: Negative for dysuria, flank pain and hematuria. Musculoskeletal: Positive for arthralgias. Allergic/Immunologic: Negative. Neurological: Negative for dizziness, speech difficulty and weakness. Hematological: Negative. Psychiatric/Behavioral: Negative for agitation and confusion. Physical Exam   BP (!) 140/87   Pulse 126   Temp 97 °F (36.1 °C) (Temporal)   Resp 20   Ht 5' 10\" (1.778 m)   Wt 198 lb 4 oz (89.9 kg)   SpO2 96%   BMI 28.45 kg/m²      Physical Exam  Vitals reviewed. Constitutional:       General: He is not in acute distress. HENT:      Nose: Nose normal.      Mouth/Throat:      Mouth: Mucous membranes are moist.   Eyes:      General: Scleral icterus present. Pupils: Pupils are equal, round, and reactive to light. Cardiovascular:      Rate and Rhythm: Tachycardia present. Pulses: Normal pulses. Pulmonary:      Effort: Pulmonary effort is normal.      Breath sounds: Normal breath sounds. Abdominal:      General: Abdomen is flat.  Bowel sounds are normal.      Palpations: Abdomen is soft. Tenderness: There is abdominal tenderness in the right upper quadrant. Musculoskeletal:         General: Normal range of motion. Cervical back: Normal range of motion and neck supple. Skin:     General: Skin is warm and dry. Neurological:      General: No focal deficit present. Mental Status: He is alert. Psychiatric:         Mood and Affect: Mood normal.         Behavior: Behavior normal.         Labs    CBC:  Recent Labs     09/01/21  1126   WBC 20.1*   RBC 5.21   HGB 14.6   HCT 45.1   MCV 86.6   RDW 15.1*        CHEMISTRIES:  Recent Labs     09/01/21  1126      K 4.1      CO2 25   BUN 16   CREATININE 0.9   GLUCOSE 110*     PT/INR:  Recent Labs     09/01/21  1126   PROTIME 16.1*   INR 1.28*     APTT:No results for input(s): APTT in the last 72 hours. LIVER PROFILE:  Recent Labs     09/01/21  1126   *   *   BILITOT 7.1*   ALKPHOS 134*       Imaging/Diagnostics   No results found. Assessment      Hospital Problems         Last Modified POA    Common bile duct (CBD) obstruction 9/1/2021 Yes      Patient with acute onset RUQ abdominal pain after eating lunch yesterday now with worsening WBC count, liver enzymes as well as bilirubin and CT scan highly suggestive of obstruction in the distal common bile duct. He did receive antibiotics in the outside ED however his worsening numbers are concerning for acute cholangitis. Will proceed with ERCP today. He will likely need cholecystectomy at some point if in fact he does have cholelithiasis as culprit for his presentation. Plan   1. Supportive care with IVFs, antiemetics and analgesics as needed. 2.  IV antibiotics. 3. Proceed with ERCP today. 4.  Further recommendations to follow results of ERCP.       Electronically signed by Elenita Payne MD on 9/1/21 at 1:08 PM CDT

## 2021-09-01 NOTE — PROGRESS NOTES
Pt returned from Echo and now being transported by stretcher to Endo. Pt remains on Cardizem gtt at 10mg/hr.  Hr remains afib 120's -130's Electronically signed by Shawn Ruiz RN on 9/1/2021 at 3:33 PM

## 2021-09-01 NOTE — BRIEF OP NOTE
Brief Postoperative Note      Patient: Reynaldo Pedro  YOB: 1945  MRN: [id number]    Date of Procedure: 9/1/2021    Pre-Op Diagnosis: Biliary obstruction    Post-Op Diagnosis: Same       Procedure(s):  ERCP STONE REMOVAL w/STENT INSERTION   ERCP with pancreatic stent insertion     Surgeon(s):  Paolo Roldan MD    Assistant:  * No surgical staff found *    Anesthesia: General    Estimated Blood Loss (mL): Minimal    Complications: None    Specimens:   * No specimens in log *    Implants:  Implant Name Type Inv.  Item Serial No.  Lot No. LRB No. Used Action   STENT PANCREAS 5FR L5CM PGTL LD ATILIO ADVANIX  STENT PANCREAS 5FR L5CM PGTL LD ATILIO [facility]- [id number] N/A 1 Implanted   STENT BILI 10FR L7CM PLAS DUODENAL BEND RAP [address] PRELD  STENT BILI 10FR L7CM PLAS DUODENAL BEND  [facility]- [id number] N/A 1 Implanted         Drains: * No LDAs found *    Findings:     - Biliary obstruction due to stone/sludge  - s/p 10F stent biliary stent placement  - s/p prophylactic pancreatic stent     Plan  - clears  - ADAT tomorrow  - Repeat ERCP in 3 months for stent removal      Electronically signed by Paolo Roldan MD on 9/1/2021 at 6:54 PM

## 2021-09-01 NOTE — ANESTHESIA PRE PROCEDURE
Department of Anesthesiology  Preprocedure Note       Name:  Chata Neely   Age:  67 y.o.  :  1945                                          MRN:  [id number]         Date:  2021      Surgeon: Kandy Carrion):  Nikki South MD    Procedure: Procedure(s):  ERCP STONE REMOVAL    Medications prior to admission:   Prior to Admission medications    Medication Sig Start Date End Date Taking?  Authorizing Provider   allopurinol (ZYLOPRIM) 100 MG tablet Take 100 mg by mouth daily   Yes Historical Provider, MD   losartan (COZAAR) 50 MG tablet Take 50 mg by mouth daily   Yes Historical Provider, MD   dilTIAZem (CARDIZEM CD) 240 MG extended release capsule Take 240 mg by mouth daily   Yes Historical Provider, MD   omeprazole (PRILOSEC) 20 MG delayed release capsule Take 40 mg by mouth daily   Yes Historical Provider, MD   colchicine (COLCRYS) 0.6 MG tablet Take 0.6 mg by mouth daily as needed for Pain   Yes Historical Provider, MD   Multiple Vitamins-Minerals (THERAPEUTIC MULTIVITAMIN-MINERALS) tablet Take 1 tablet by mouth daily   Yes Historical Provider, MD       Current medications:    Current Facility-Administered Medications   Medication Dose Route Frequency Provider Last Rate Last Admin    sodium chloride flush 0.9 % injection 5-40 mL  5-40 mL IntraVENous 2 times per day Sugar Benitez MD        sodium chloride flush 0.9 % injection 5-40 mL  5-40 mL IntraVENous PRN Sugar Benitez MD        0.9 % sodium chloride infusion  25 mL IntraVENous PRN Sugar Benitez MD        dilTIAZem 125 mg in dextrose 5 % 125 mL infusion  5-15 mg/hr IntraVENous Continuous Sugar Benitez MD 10 mL/hr at 21 1145 10 mg/hr at 21 1145    sodium chloride flush 0.9 % injection 5-40 mL  5-40 mL IntraVENous 2 times per day Sugar Benitez MD        sodium chloride flush 0.9 % injection 5-40 mL  5-40 mL IntraVENous PRN Sugar Benitez MD        0.9 % sodium chloride infusion  25 mL IntraVENous PRN Sugar Benitez MD  enoxaparin (LOVENOX) injection 40 mg  40 mg SubCUTAneous Daily Marvin Hedrick MD   40 mg at 09/01/21 1143    ondansetron (ZOFRAN-ODT) disintegrating tablet 4 mg  4 mg Oral Q8H PRN Marvin Hedrick MD        Or    ondansetron [facility]) injection 4 mg  4 mg IntraVENous Q6H PRN Marvin Hedrick MD        polyethylene glycol (GLYCOLAX) packet 17 g  17 g Oral Daily PRN Marvin Hedrick MD        potassium chloride (KLOR-CON M) extended release tablet 40 mEq  40 mEq Oral PRN Marvin Hedrick MD        Or    potassium bicarb-citric acid (EFFER-K) effervescent tablet 40 mEq  40 mEq Oral PRN Marvin Hedrick MD        Or    potassium chloride 10 mEq/100 mL IVPB (Peripheral Line)  10 mEq IntraVENous PRN Marvin Hedrick MD        magnesium sulfate 2000 mg in 50 mL IVPB premix  2,000 mg IntraVENous PRN Marvin Hedrick MD        sodium chloride flush 0.9 % injection 10 mL  10 mL IntraVENous 2 times per day Atul Fisher MD        sodium chloride flush 0.9 % injection 10 mL  10 mL IntraVENous PRN Atul Fisher MD        0.9 % sodium chloride infusion  25 mL IntraVENous PRN Atul Fisher MD        piperacillin-tazobactam (ZOSYN) 3,375 mg in dextrose 5 % 50 mL IVPB extended infusion (mini-bag)  3,375 mg IntraVENous Q8H Marvin Hedrick MD        ibuprofen (ADVIL;MOTRIN) tablet 400 mg  400 mg Oral Q6H PRN Marvin Hedrick MD        pantoprazole (PROTONIX) injection 40 mg  40 mg IntraVENous Daily Marvin Hedrick MD        And    sodium chloride (PF) 0.9 % injection 10 mL  10 mL IntraVENous Daily Marvin Hedrick MD        indomethacin (INDOCIN) 50 MG suppository 100 mg  100 mg Rectal Once Dexter Sampson MD        0.9 % sodium chloride infusion   IntraVENous Continuous Marvin Hedrick MD        HYDROmorphone HCl PF (DILAUDID) injection 1 mg  1 mg IntraVENous Q4H PRN Marvin Hedrick MD        melatonin disintegrating tablet 5 mg  5 mg Oral Nightly Marvin Hedrick MD           Allergies:  No Known Allergies    Problem List:    Patient Active Problem List   Diagnosis Code    Common bile duct (CBD) obstruction K83.1       Past Medical History:        Diagnosis Date    Arthritis     Chronic kidney disease     COPD [facility]chronic obstructive pulmonary disease) ([facility] [address]     GERD (gastroesophageal reflux disease)     Hypertension     Pneumonia        Past Surgical History:        Procedure Laterality Date    APPENDECTOMY      TONSILLECTOMY         Social History:    Social History     Tobacco Use    Smoking status: Former Smoker     Types: Cigarettes    Smokeless tobacco: Never Used    Tobacco comment: quit smoking 30 yrs ago   Substance Use Topics    Alcohol use: Not Currently                                Counseling given: No  Comment: quit smoking 30 yrs ago      Vital Signs (Current):   Vitals:    09/01/21 0900 09/01/21 1109   BP: (!) 140/87    Pulse: 126    Resp: 18 20   Temp: 97 °F (36.1 °C)    TempSrc: Temporal    SpO2: 96%    Weight: 198 lb 4 oz (89.9 kg)    Height: 5' 10\" (1.778 m)                                               BP Readings from Last 3 Encounters:   09/01/21 (!) 140/87       NPO Status:                                                                                 BMI:   Wt Readings from Last 3 Encounters:   09/01/21 198 lb 4 oz (89.9 kg)     Body mass index is 28.45 kg/m².     CBC:   Lab Results   Component Value Date    WBC 20.1 09/01/2021    RBC 5.21 09/01/2021    HGB 14.6 09/01/2021    HCT 45.1 09/01/2021    MCV 86.6 09/01/2021    RDW 15.1 09/01/2021     09/01/2021       CMP:   Lab Results   Component Value Date     09/01/2021    K 4.1 09/01/2021     09/01/2021    CO2 25 09/01/2021    BUN 16 09/01/2021    CREATININE 0.9 09/01/2021    GFRAA >59 09/01/2021    LABGLOM >60 09/01/2021    GLUCOSE 110 09/01/2021    PROT 6.8 09/01/2021    CALCIUM 9.4 09/01/2021    BILITOT 7.1 09/01/2021    ALKPHOS 134 09/01/2021     09/01/2021     09/01/2021       POC Tests: No results for input(s): POCGLU, POCNA, POCK, POCCL, POCBUN, POCHEMO, POCHCT in the last 72 hours. Coags:   Lab Results   Component Value Date    PROTIME 16.1 09/01/2021    INR 1.28 09/01/2021       HCG (If Applicable): No results found for: PREGTESTUR, PREGSERUM, HCG, HCGQUANT     ABGs: No results found for: PHART, PO2ART, ZDT5CWB, UPO4XFB, BEART, P6TXZSFB     Type & Screen (If Applicable):  No results found for: LABABO, LABRH    Drug/Infectious Status (If Applicable):  No results found for: HIV, HEPCAB    COVID-19 Screening (If Applicable): No results found for: COVID19        Anesthesia Evaluation  Patient summary reviewed and Nursing notes reviewed no history of anesthetic complications:   Airway: Mallampati: II  TM distance: >3 FB   Neck ROM: full  Mouth opening: > = 3 FB Dental: normal exam   (+) caps      Pulmonary:Negative Pulmonary ROS and normal exam  breath sounds clear to auscultation  (+) pneumonia:  COPD:      (-) shortness of breath and not a current smoker          Patient did not smoke on day of surgery. Cardiovascular:    (+) hypertension:, dysrhythmias: atrial fibrillation,     (-) CAD,  angina and  CHF    NYHA Classification: I  ECG reviewed  Rhythm: irregular  Rate: abnormal           Beta Blocker:  Not on Beta Blocker         Neuro/Psych:   Negative Neuro/Psych ROS     (-) seizures, CVA and depression/anxiety            GI/Hepatic/Renal: Neg GI/Hepatic/Renal ROS  (+) GERD:, renal disease: CRI,      (-) hiatal hernia       Endo/Other: Negative Endo/Other ROS   (+) blood dyscrasia::., .          Pt had PAT visit. Abdominal:   (+) obese,     Abdomen: soft. Vascular: Other Findings:           Anesthesia Plan      general     ASA 3 - emergent     (Iv zofran within 30 min of closing )  Induction: intravenous. BIS  MIPS: Postoperative opioids intended and Prophylactic antiemetics administered. Anesthetic plan and risks discussed with patient.     Use of blood products discussed with patient whom. Plan discussed with CRNA.     Attending anesthesiologist reviewed and agrees with Pre Eval content              Dong Isbell MD   9/1/2021

## 2021-09-01 NOTE — ANESTHESIA POSTPROCEDURE EVALUATION
Department of Anesthesiology  Postprocedure Note    Patient: Wilder Hooks  MRN: [id number]  YOB: 1945  Date of evaluation: 9/1/2021  Time:  6:57 PM     Procedure Summary     Date: 09/01/21 Room / Location: [address]    Anesthesia Start: 2902 Anesthesia Stop: 1857    Procedure: ERCP STONE REMOVAL w/STENT INSERTION (N/A Abdomen) Diagnosis: (Biliary obstruction)    Surgeons: Fela Mcfadden MD Responsible Provider: ELYSE Estrada CRNA    Anesthesia Type: general ASA Status: 3 - Emergent          Anesthesia Type: general    Cali Phase I:      Cali Phase II:      Last vitals: Reviewed and per EMR flowsheets.        Anesthesia Post Evaluation    Patient location during evaluation: PACU  Patient participation: complete - patient participated  Level of consciousness: sleepy but conscious  Pain score: 0  Airway patency: patent  Nausea & Vomiting: no nausea and no vomiting  Complications: no  Cardiovascular status: hemodynamically stable  Respiratory status: acceptable, spontaneous ventilation and face mask  Hydration status: euvolemic

## 2021-09-01 NOTE — PLAN OF CARE
Dr Silas Robertson agreed to consult and provide ERCP if needed  \"Patient is at [facility] referring  Referring patient Dr Reynaldo Pedro is a pleasant 67year old man  This PM 1/2 hour s/p lunvh midabdominal pain  Never had this before  Labs showed LFTs elevated   Bili 2.7  ALT like 300 and stuff  CT scan showed CBD dilated at 1.3cm that cuts off at head of pancreas  No stone seen there however  Stone suspected  Dr Liliya Woodall  VSS  Hx A Fib  Not on [facility]'  HR at 80 PoA, then at 160  S/p Cardizem was at 80  Takes nothing for rate control either  Full Code  Had 2 doses vaccine  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

## 2021-09-02 ENCOUNTER — TELEPHONE (OUTPATIENT)
Dept: GASTROENTEROLOGY | Age: 76
End: 2021-09-02

## 2021-09-02 DIAGNOSIS — K83.1 COMMON BILE DUCT (CBD) OBSTRUCTION: Primary | ICD-10-CM

## 2021-09-02 LAB
ALBUMIN SERPL-MCNC: 3.5 G/DL (ref 3.5–5.2)
ALP BLD-CCNC: 116 U/L (ref 40–130)
ALT SERPL-CCNC: 343 U/L (ref 5–41)
ANION GAP SERPL CALCULATED.3IONS-SCNC: 11 MMOL/L (ref 7–19)
AST SERPL-CCNC: 225 U/L (ref 5–40)
BILIRUB SERPL-MCNC: 4.4 MG/DL (ref 0.2–1.2)
BUN BLDV-MCNC: 21 MG/DL (ref 8–23)
CALCIUM SERPL-MCNC: 8.8 MG/DL (ref 8.8–10.2)
CHLORIDE BLD-SCNC: 102 MMOL/L (ref 98–111)
CO2: 22 MMOL/L (ref 22–29)
CREAT SERPL-MCNC: 1.3 MG/DL (ref 0.5–1.2)
GFR AFRICAN AMERICAN: >59
GFR NON-AFRICAN AMERICAN: 54
GLUCOSE BLD-MCNC: 170 MG/DL (ref 74–109)
HCT VFR BLD CALC: 40.8 % (ref 42–52)
HEMOGLOBIN: 13.1 G/DL (ref 14–18)
LACTIC ACID: 1.6 MMOL/L (ref 0.5–1.9)
MCH RBC QN AUTO: 27.8 PG (ref 27–31)
MCHC RBC AUTO-ENTMCNC: 32.1 G/DL (ref 33–37)
MCV RBC AUTO: 86.6 FL (ref 80–94)
PDW BLD-RTO: 15.4 % (ref 11.5–14.5)
PLATELET # BLD: 121 K/UL (ref 130–400)
PMV BLD AUTO: 11.9 FL (ref 9.4–12.4)
POTASSIUM SERPL-SCNC: 4.2 MMOL/L (ref 3.5–5)
RBC # BLD: 4.71 M/UL (ref 4.7–6.1)
SODIUM BLD-SCNC: 135 MMOL/L (ref 136–145)
TOTAL PROTEIN: 5.6 G/DL (ref 6.6–8.7)
WBC # BLD: 16.8 K/UL (ref 4.8–10.8)

## 2021-09-02 PROCEDURE — 83605 ASSAY OF LACTIC ACID: CPT

## 2021-09-02 PROCEDURE — 80053 COMPREHEN METABOLIC PANEL: CPT

## 2021-09-02 PROCEDURE — 99232 SBSQ HOSP IP/OBS MODERATE 35: CPT | Performed by: INTERNAL MEDICINE

## 2021-09-02 PROCEDURE — 2140000000 HC CCU INTERMEDIATE R&B

## 2021-09-02 PROCEDURE — 85027 COMPLETE CBC AUTOMATED: CPT

## 2021-09-02 PROCEDURE — 2500000003 HC RX 250 WO HCPCS: Performed by: INTERNAL MEDICINE

## 2021-09-02 PROCEDURE — 6370000000 HC RX 637 (ALT 250 FOR IP): Performed by: FAMILY MEDICINE

## 2021-09-02 PROCEDURE — 6360000002 HC RX W HCPCS: Performed by: INTERNAL MEDICINE

## 2021-09-02 PROCEDURE — C9113 INJ PANTOPRAZOLE SODIUM, VIA: HCPCS | Performed by: INTERNAL MEDICINE

## 2021-09-02 PROCEDURE — 6370000000 HC RX 637 (ALT 250 FOR IP): Performed by: INTERNAL MEDICINE

## 2021-09-02 PROCEDURE — 99252 IP/OBS CONSLTJ NEW/EST SF 35: CPT | Performed by: SURGERY

## 2021-09-02 PROCEDURE — 2580000003 HC RX 258: Performed by: INTERNAL MEDICINE

## 2021-09-02 PROCEDURE — 36415 COLL VENOUS BLD VENIPUNCTURE: CPT

## 2021-09-02 RX ORDER — DILTIAZEM HYDROCHLORIDE 120 MG/1
240 CAPSULE, COATED, EXTENDED RELEASE ORAL DAILY
Status: DISCONTINUED | OUTPATIENT
Start: 2021-09-02 | End: 2021-09-03 | Stop reason: HOSPADM

## 2021-09-02 RX ORDER — ALLOPURINOL 100 MG/1
100 TABLET ORAL DAILY
Status: DISCONTINUED | OUTPATIENT
Start: 2021-09-02 | End: 2021-09-03 | Stop reason: HOSPADM

## 2021-09-02 RX ADMIN — PIPERACILLIN AND TAZOBACTAM 3375 MG: 3; .375 INJECTION, POWDER, LYOPHILIZED, FOR SOLUTION INTRAVENOUS at 06:38

## 2021-09-02 RX ADMIN — Medication 5 MG: at 21:59

## 2021-09-02 RX ADMIN — ENOXAPARIN SODIUM 40 MG: 40 INJECTION SUBCUTANEOUS at 09:03

## 2021-09-02 RX ADMIN — SODIUM CHLORIDE, PRESERVATIVE FREE 10 ML: 5 INJECTION INTRAVENOUS at 09:02

## 2021-09-02 RX ADMIN — ALLOPURINOL 100 MG: 100 TABLET ORAL at 09:14

## 2021-09-02 RX ADMIN — SODIUM CHLORIDE, PRESERVATIVE FREE 10 ML: 5 INJECTION INTRAVENOUS at 22:01

## 2021-09-02 RX ADMIN — PANTOPRAZOLE SODIUM 40 MG: 40 INJECTION, POWDER, FOR SOLUTION INTRAVENOUS at 09:03

## 2021-09-02 RX ADMIN — PIPERACILLIN AND TAZOBACTAM 3375 MG: 3; .375 INJECTION, POWDER, LYOPHILIZED, FOR SOLUTION INTRAVENOUS at 14:03

## 2021-09-02 RX ADMIN — DILTIAZEM HYDROCHLORIDE 10 MG/HR: 5 INJECTION INTRAVENOUS at 00:17

## 2021-09-02 RX ADMIN — PIPERACILLIN AND TAZOBACTAM 3375 MG: 3; .375 INJECTION, POWDER, LYOPHILIZED, FOR SOLUTION INTRAVENOUS at 21:58

## 2021-09-02 RX ADMIN — DILTIAZEM HYDROCHLORIDE 240 MG: 120 CAPSULE, COATED, EXTENDED RELEASE ORAL at 10:22

## 2021-09-02 RX ADMIN — SODIUM CHLORIDE, PRESERVATIVE FREE 10 ML: 5 INJECTION INTRAVENOUS at 22:00

## 2021-09-02 ASSESSMENT — ENCOUNTER SYMPTOMS
WHEEZING: 0
DIARRHEA: 0
SHORTNESS OF BREATH: 0
NAUSEA: 0
ABDOMINAL PAIN: 1
EYE PAIN: 0
COUGH: 0
BACK PAIN: 0
ABDOMINAL DISTENTION: 1
CONSTIPATION: 0
EYE REDNESS: 0
SORE THROAT: 0

## 2021-09-02 ASSESSMENT — PAIN SCALES - GENERAL
PAINLEVEL_OUTOF10: 0
PAINLEVEL_OUTOF10: 0

## 2021-09-02 NOTE — PROGRESS NOTES
GI  - PROGRESS NOTE    Subjective:   Admit Date: 9/1/2021  PCP: Nagi Kwok    Patient being seen for: biliary obstruction    24hr events/Interval History: Patient s/p ERCP with sphincterotomy/stone extraction and stent placement. ADULT DIET; Regular; GI [place] (GERD/Peptic Ulcer)     Tolerated                  Pain:None  Nausea:None      Medications:  Scheduled Meds:   dilTIAZem  240 mg Oral Daily    allopurinol  100 mg Oral Daily    sodium chloride flush  5-40 mL IntraVENous 2 times per day    sodium chloride flush  5-40 mL IntraVENous 2 times per day    enoxaparin  40 mg SubCUTAneous Daily    sodium chloride flush  10 mL IntraVENous 2 times per day    piperacillin-tazobactam  3,375 mg IntraVENous Q8H    pantoprazole  40 mg IntraVENous Daily    And    sodium chloride (PF)  10 mL IntraVENous Daily    indomethacin  100 mg Rectal Once    melatonin  5 mg Oral Nightly       Continuous Infusions:   sodium chloride      sodium chloride      sodium chloride         PRN Meds:.perflutren lipid microspheres, sodium chloride flush, sodium chloride, sodium chloride flush, sodium chloride, ondansetron **OR** ondansetron, polyethylene glycol, potassium chloride **OR** potassium alternative oral replacement **OR** potassium chloride, magnesium sulfate, sodium chloride flush, sodium chloride, ibuprofen, HYDROmorphone, indomethacin      Labs:     Recent Labs     09/01/21 1126 09/02/21  0207   WBC 20.1* 16.8*   RBC 5.21 4.71   HGB 14.6 13.1*   HCT 45.1 40.8*   MCV 86.6 86.6   MCH 28.0 27.8   MCHC 32.4* 32.1*    121*     Recent Labs     09/01/21  1126 09/02/21  0207    135*   K 4.1 4.2   ANIONGAP 14 11    102   CO2 25 22   BUN 16 21   CREATININE 0.9 1.3*   GLUCOSE 110* 170*   CALCIUM 9.4 8.8     No results for input(s): MG, PHOS in the last 72 hours.   Recent Labs     09/01/21 1126 09/02/21  0207   * 225*   * 343*   BILITOT 7.1* Procedure Type of Study   TTE procedure:ECHO 2D W/DOPPLER/COLOR/CONTRAST. Study Location: Echo Lab Technical Quality: Adequate visualization Patient Status: Inpatient Contrast Medium: Definity. Amount - 4 ml Rhythm: Atrial fibrillation BP: 140/87 mmHg Indications:Atrial fibrillation. Conclusions   Summary  Rhythm is noted to be atrial fibrillation. Normal left ventricular chamber size and systolic function. Moderate concentric left ventricular hypertrophy. Left ventricular ejection fraction is visually estimated at 65%. Aortic valve leaflets are mildly thickened. There is mildly decreased  excursion. Mild aortic stenosis is present. The peak gradient across the aortic valve is 36 mmHg. The mean gradient across the aortic valve is 19 mmHg. The left atrium is mildly dilated. Signature   ----------------------------------------------------------------  Electronically signed by Devendra Regalado DO(Interpreting  physician) on 09/01/2021 05:56 PM  ----------------------------------------------------------------   Findings   Mitral Valve  Structurally normal mitral valve leaflets. There is no mitral regurgitation. Aortic Valve  Aortic valve leaflets are mildly thickened. There is mildly decreased  excursion. Mild aortic stenosis is present. The peak gradient across the aortic valve is 36 mmHg. The mean gradient across the aortic valve is 19 mmHg. No aortic regurgitation . Tricuspid Valve  Normal tricuspid valve leaflet thickness and excursion. There is trace tricuspid regurgitation. Estimated PA systolic pressure is 45NA mercury. Pulmonic Valve  No significant pulmonic regurgitation. Left Atrium  The left atrium is mildly dilated. Left Ventricle  Normal left ventricular chamber size and systolic function. Moderate concentric left ventricular hypertrophy. No regional wall motion abnormalities. Left ventricular ejection fraction is visually estimated at 65%.   Diastolic function is indeterminate. Right Atrium  Normal right atrial size. Right Ventricle  Normal right ventricular chamber size and function. Pericardial Effusion  No pericardial effusion. Miscellaneous  IVC diameter is normal, suggesting normal right atrial pressure. M-Mode Measurements (cm)   LVIDd: 4.14 cm                       LVIDs: 2.56 cm  IVSd: 1.37 cm  LVPWd: 1.59 cm                       AO Root Dimension: 1.9 cm  % Ejection Fraction: 65 %            LA: 4.4 cm                                       LVOT: 2 cm  Doppler Measurements:   AV Peak Velocity:300 cm/s            MV Peak E-Wave: 129 cm/s  AV Peak Gradient: 36 mmHg  AV Mean Gradient: 19 mmHg            MV Peak Gradient: 6.66 mmHg  AV Area (Continuity):1.41 cm^2  TR Velocity:326 cm/s  TR Gradient:42.51 mmHg  Estimated RAP:8 mmHg  RVSP:50 mmHg            Physical Exam:     Vitals:    09/02/21 1030 09/02/21 1050 09/02/21 1230 09/02/21 1421   BP: 119/86 117/84 125/88 118/88   Pulse: 106  120 117   Resp: 18  18 20   Temp: 97.7 °F (36.5 °C)  97.8 °F (36.6 °C) 97.9 °F (36.6 °C)   TempSrc: Temporal  Temporal Oral   SpO2: 93% 97% 96% 91%   Weight:       Height:         24HR INTAKE/OUTPUT:      Intake/Output Summary (Last 24 hours) at 9/2/2021 1446  Last data filed at 9/2/2021 1410  Gross per 24 hour   Intake 1629 ml   Output 2025 ml   Net -396 ml     General appearance: alert and cooperative with exam  Lungs: clear to auscultation bilaterally  Heart: regular rate and rhythm  Abdomen: soft, non-tender; bowel sounds normal; no masses,  no organomegaly  Extremities: extremities normal, atraumatic, no cyanosis or edema  Neurologic: No obvious focal neurologic deficits. Impression:       Biliary obstruction secondary to choledocholithiasis with cholangitis - s/p ERCP with sphincterotomy and stone extraction with niranjan pus in bile duct. Patient will need repeat ERCP with stent removal in 3 months. He should continue antibiotics for 5 days and recommend Augmentin.   He will need cholecystectomy and will have surgery evaluate him prior to discharge. Plan:     1. Continue to advance diet  2. Augmentin 875 mg BID for 5 days  3. Surgical consultation for cholecystectomy  4. Repeat ERCP in 3 months for stent removal.    Should be stable for DC this evening.

## 2021-09-02 NOTE — DISCHARGE SUMMARY
Discharge Summary  Date:9/3/2021        Patient Name:Jeff Zamudio     YOB: 1945     Age:75 y.o. Admit Date:9/1/2021   Admission Condition:poor   Discharged Condition:good  Discharge Date: 09/03/21     Discharge Diagnoses   Active Problems:    Common bile duct (CBD) obstruction  Resolved Problems:    * No resolved hospital problems. [facility] AND [facility] Stay   Narrative of Hospital Course: Patient was transferred to [facility] from [facility] 9/1/2021. Patient is a 71-year-old  male with a known past medical history of atrial fibrillation, essential hypertension, gastroesophageal reflux disease, gout transferred from [facility] to PCU due to abdominal discomfort, CT abdomen and pelvis with abnormal dilation of her common bile duct worrisome for biliary obstruction as well as atrial fibrillation with rapid ventricular rate. Patient seen in PCU bed 729, in no acute distress, feels as though his abdomen is less bloated. Stated initial onset of pain occurred while working at [employer] after a meal which consisted of bologna and cheese. Patient denies any prior episodes of this pain. Labs at [facility] revealed white count of 16.8, with some bands elevation. Metabolic profile revealing , , alkaline phosphatase 143, total bilirubin 2.7. Patient was placed n.p.o., due to atrial fibrillation, echocardiogram was obtained revealing mild aortic stenosis, ejection fraction of 65%. Gastroenterology was consulted patient underwent  ERCP with stone removal with stent insertion. Patient's diet was cleared and tolerated well, per gastroenterology patient will need repeat ERCP in 3 months. Patient was advanced to soft diet and tolerated well. Microbiology department has been called, blood cultures remain negative to date. Patient will be discharged home to complete course of Augmentin antibiotic therapy plan for 5 days.   Follow-up with gastroenterology as scheduled. Patient's heart rate has improved off diltiazem drip transition back to home dosing, with addition of metoprolol 25 mg twice daily. Transthoracic echocardiogram revealing preserved ejection fraction, mild aortic stenosis. EKG materials attached after visit summary. Follow-up with primary care provider in the next 1 to 2 weeks for hospital discharge with ongoing coordination of care. Patient to follow-up with cardiology service in the next week. Consultants:   IP CONSULT TO GI  IP CONSULT TO GENERAL SURGERY    Time Spent on Discharge:  45 minutes were spent in patient examination, evaluation, counseling as well as medication reconciliation, prescriptions for required medications, discharge plan and follow up.       Surgeries/Procedures Performed:  Procedure(s):  ERCP STONE REMOVAL w/STENT INSERTION     Treatments:   IV hydration, antibiotics: Zosyn, analgesia: Dilaudid and ibuprofen, indomethacin, anticoagulation: LMW heparin, respiratory therapy: O2 and electrolyte monitoring and stabilization    Significant Diagnostic Studies:   Recent Labs:  CBC:   Lab Results   Component Value Date    WBC 16.8 09/02/2021    RBC 4.71 09/02/2021    HGB 13.1 09/02/2021    HCT 40.8 09/02/2021    MCV 86.6 09/02/2021    MCH 27.8 09/02/2021    MCHC 32.1 09/02/2021    RDW 15.4 09/02/2021     09/02/2021     BMP:    Lab Results   Component Value Date    GLUCOSE 170 09/02/2021     09/02/2021    K 4.2 09/02/2021     09/02/2021    CO2 22 09/02/2021    ANIONGAP 11 09/02/2021    BUN 21 09/02/2021    CREATININE 1.3 09/02/2021    CALCIUM 8.8 09/02/2021    LABGLOM 54 09/02/2021    GFRAA >59 09/02/2021     HFP:    Lab Results   Component Value Date    PROT 5.6 09/02/2021     CMP:    Lab Results   Component Value Date    GLUCOSE 170 09/02/2021     09/02/2021    K 4.2 09/02/2021     09/02/2021    CO2 22 09/02/2021    BUN 21 09/02/2021    CREATININE 1.3 09/02/2021    ANIONGAP 11 09/02/2021    ALKPHOS 116 09/02/2021     09/02/2021     09/02/2021    BILITOT 4.4 09/02/2021    LABALBU 3.5 09/02/2021    LABGLOM 54 09/02/2021    GFRAA >59 09/02/2021    PROT 5.6 09/02/2021    CALCIUM 8.8 09/02/2021     PT/INR:    Lab Results   Component Value Date    PROTIME 16.1 09/01/2021    INR 1.28 09/01/2021     PTT: No results found for: APTT  FLP:  No results found for: CHOL, TRIG, HDL  U/A:  No results found for: COLORU, TURBIDITY, SPECGRAV, HGBUR, PHUR, PROTEINU, GLUCOSEU, KETUA, BILIRUBINUR, UROBILINOGEN, NITRU, LEUKOCYTESUR  TSH:    Lab Results   Component Value Date    TSH 2.030 09/01/2021       Radiology Last 7 Days:  FL ERCP BILIARY S&I    Result Date: 9/1/2021  1. . There is mild extrahepatic biliary dilatation. Filling defects within the distal common duct likely represent stone/sludge. These were subsequently removed. A pancreatic duct and common bile duct stent are left in place. The films are available to or for review. Signed by Dr Wilian Ambriz reviewed. Constitutional:       General: He is not in acute distress. Appearance: He is not ill-appearing. HENT:      Head: Normocephalic and atraumatic. Nose: Nose normal.   Eyes:      General:         Right eye: No discharge. Left eye: No discharge. Cardiovascular:      Rate and Rhythm: Tachycardia, improved. Rhythm irregularly irregular. Pulses: Normal pulses. Pulmonary:      Effort: No respiratory distress. Breath sounds: No wheezing. Abdominal:      Tenderness: There is abdominal tenderness. There is no guarding or rebound. Comments: Improving   Musculoskeletal:      Right lower leg: No edema. Left lower leg: No edema. Skin:     General: Skin is warm. Coloration: Skin is not pale. Findings: No erythema. Neurological:      Mental Status: He is alert and oriented to person, place, and time. Mental status is at baseline.    Psychiatric:         Mood and Affect: Mood normal.          Discharge Plan   Disposition: Home    Provider Follow-Up:   Camillajanice Sherman  [address] [address]  [phone]    On 9/8/2021  9:45am hospital follow-up         Patient Instructions   Diet: low fat, low cholesterol diet    Activity: activity as tolerated      Discharge Medications         Medication List      START taking these medications    amoxicillin-clavulanate 875-125 MG per tablet  Commonly known as: AUGMENTIN  Take 1 tablet by mouth 2 times daily for 5 days     aspirin EC 81 MG EC tablet  Take 1 tablet by mouth daily     melatonin 5 MG Tbdp disintegrating tablet  Take 1 tablet by mouth nightly     metoprolol tartrate 25 MG tablet  Commonly known as: LOPRESSOR  Take 1 tablet by mouth 2 times daily        CONTINUE taking these medications    allopurinol 100 MG tablet  Commonly known as: ZYLOPRIM     colchicine 0.6 MG tablet  Commonly known as: COLCRYS     dilTIAZem 240 MG extended release capsule  Commonly known as: CARDIZEM CD     losartan 50 MG tablet  Commonly known as: COZAAR     omeprazole 20 MG delayed release capsule  Commonly known as: PRILOSEC     therapeutic multivitamin-minerals tablet           Where to Get Your Medications      These medications were sent to [facility], [address]  [facility] Sima Jewell [address] [place] [address], [address] [place] [address] Road [address]    Phone: [phone]   · amoxicillin-clavulanate 875-125 MG per tablet  · aspirin EC 81 MG EC tablet  · melatonin 5 MG Tbdp disintegrating tablet  · metoprolol tartrate 25 MG tablet         EMR Dragon/Transcription disclaimer:   Much of this encounter note is an electronic transcription/translation of spoken language to printed text.  The electronic translation of spoken language may permit erroneous, or at times, nonsensical words or phrases to be inadvertently transcribed; although attempts have made to review the note for such errors, some may still exist.    Electronically signed by   Deja Lynch MD   Internal Medicine Hospitalist  On 9/3/2021  At 1:46 PM

## 2021-09-02 NOTE — TELEPHONE ENCOUNTER
Rossana Carcamo,    Per Dr Sharon Sim yesterday:    Findings:      - Biliary obstruction due to stone/sludge  - s/p 10F stent biliary stent placement  - s/p prophylactic pancreatic stent      Plan  - clears  - ADAT tomorrow  - Repeat ERCP in 3 months for stent removal        Electronically signed by Erma Laurent MD on 9/1/2021 at 6:54 PM

## 2021-09-02 NOTE — PROGRESS NOTES
Charles Fountain RN in room with patient and family member. Pt belongings went with him including clothes and hearing aids.

## 2021-09-02 NOTE — PROGRESS NOTES
Physician Progress Note      Riki Aggarwal  [place] #:                  [id number]  :                       1945  ADMIT DATE:       2021 8:58 AM  DISCH DATE:  RESPONDING  PROVIDER #:        Epifanio Lopez MD        QUERY TEXT:    Stage of Chronic Kidney Disease: Please provide further specificity, if known. Clinical indicators include: chronic kidney disease, bun, creatinine, gfr,   probnp, pro-bnp  Options provided:  -- Chronic kidney disease stage 1  -- Chronic kidney disease stage 2  -- Chronic kidney disease stage 3  -- Chronic kidney disease stage 3a  -- Chronic kidney disease stage 3b  -- Chronic kidney disease stage 4  -- Chronic kidney disease stage 5  -- Chronic kidney disease stage 5, requiring dialysis  -- End stage renal disease  -- Other - I will add my own diagnosis  -- Disagree - Not applicable / Not valid  -- Disagree - Clinically Unable to determine / Unknown        PROVIDER RESPONSE TEXT:    The patient has chronic kidney disease stage 3.       Electronically signed by:  Epifanio Lopez MD 2021 10:57 AM

## 2021-09-03 VITALS
HEART RATE: 103 BPM | OXYGEN SATURATION: 94 % | SYSTOLIC BLOOD PRESSURE: 134 MMHG | WEIGHT: 206.13 LBS | DIASTOLIC BLOOD PRESSURE: 75 MMHG | RESPIRATION RATE: 16 BRPM | HEIGHT: 70 IN | TEMPERATURE: 96.2 F | BODY MASS INDEX: 29.51 KG/M2

## 2021-09-03 PROCEDURE — 6360000002 HC RX W HCPCS: Performed by: INTERNAL MEDICINE

## 2021-09-03 PROCEDURE — 43274 ERCP DUCT STENT PLACEMENT: CPT | Performed by: INTERNAL MEDICINE

## 2021-09-03 PROCEDURE — 6370000000 HC RX 637 (ALT 250 FOR IP): Performed by: FAMILY MEDICINE

## 2021-09-03 PROCEDURE — 74330 X-RAY BILE/PANC ENDOSCOPY: CPT | Performed by: INTERNAL MEDICINE

## 2021-09-03 PROCEDURE — 2580000003 HC RX 258: Performed by: INTERNAL MEDICINE

## 2021-09-03 PROCEDURE — C9113 INJ PANTOPRAZOLE SODIUM, VIA: HCPCS | Performed by: INTERNAL MEDICINE

## 2021-09-03 PROCEDURE — 43264 ERCP REMOVE DUCT CALCULI: CPT | Performed by: INTERNAL MEDICINE

## 2021-09-03 RX ORDER — AMOXICILLIN AND CLAVULANATE POTASSIUM 875; 125 MG/1; MG/1
1 TABLET, FILM COATED ORAL 2 TIMES DAILY
Qty: 10 TABLET | Refills: 0 | Status: SHIPPED | OUTPATIENT
Start: 2021-09-03 | End: 2021-09-08

## 2021-09-03 RX ORDER — MECOBALAMIN 5000 MCG
5 TABLET,DISINTEGRATING ORAL NIGHTLY
Qty: 30 TABLET | Refills: 0 | Status: SHIPPED | OUTPATIENT
Start: 2021-09-03 | End: 2022-05-12

## 2021-09-03 RX ORDER — ASPIRIN 81 MG/1
81 TABLET ORAL DAILY
Qty: 90 TABLET | Refills: 1 | Status: SHIPPED | OUTPATIENT
Start: 2021-09-03 | End: 2021-09-29

## 2021-09-03 RX ADMIN — SODIUM CHLORIDE, PRESERVATIVE FREE 10 ML: 5 INJECTION INTRAVENOUS at 08:35

## 2021-09-03 RX ADMIN — PIPERACILLIN AND TAZOBACTAM 3375 MG: 3; .375 INJECTION, POWDER, LYOPHILIZED, FOR SOLUTION INTRAVENOUS at 07:20

## 2021-09-03 RX ADMIN — METOPROLOL TARTRATE 25 MG: 25 TABLET, FILM COATED ORAL at 09:48

## 2021-09-03 RX ADMIN — SODIUM CHLORIDE, PRESERVATIVE FREE 10 ML: 5 INJECTION INTRAVENOUS at 08:36

## 2021-09-03 RX ADMIN — PANTOPRAZOLE SODIUM 40 MG: 40 INJECTION, POWDER, FOR SOLUTION INTRAVENOUS at 08:36

## 2021-09-03 RX ADMIN — DILTIAZEM HYDROCHLORIDE 240 MG: 120 CAPSULE, COATED, EXTENDED RELEASE ORAL at 08:35

## 2021-09-03 RX ADMIN — ENOXAPARIN SODIUM 40 MG: 40 INJECTION SUBCUTANEOUS at 08:35

## 2021-09-03 RX ADMIN — ALLOPURINOL 100 MG: 100 TABLET ORAL at 08:35

## 2021-09-03 ASSESSMENT — PAIN SCALES - GENERAL
PAINLEVEL_OUTOF10: 0
PAINLEVEL_OUTOF10: 0

## 2021-09-03 NOTE — PROGRESS NOTES
Mr. Warren Wilson is a 67year old male who presented in transfer from [address] with complaint of abdominal pain. He had had pain start suddenly, shortly after eating. On imaging, he had findings consistent with CBD obstruction, as well as elevated bilirubin and transaminases on labs. He was transferred to [facility] for consideration of ERCP. The patient underwent ERCP yesterday with placement of stent and removal of stone/sludge. He was also found to have purulent drainage from the CBD at that time. The patient reports that he is feeling quite a bit better, now just having some soreness across the lower abdomen. He states that during the initial episode he had severe chills. He denies any current nausea, vomiting, diarrhea, or constipation. Of note, he is also being treated for a fib.     Past Medical History:   Diagnosis Date    Arthritis     Chronic kidney disease     COPD (chronic obstructive pulmonary disease) ([address]     GERD (gastroesophageal reflux disease)     Hypertension     Pneumonia      Past Surgical History:   Procedure Laterality Date    APPENDECTOMY      ERCP N/A 09/01/2021    Dr CARLO Bowling-w/placement of a 10F x 7 cm biliary stent and a 7F x 7 cm pancreatic stent, stone removal-Biliary obstruction due to stone/sludge, 3 month repeat    TONSILLECTOMY       Current Facility-Administered Medications   Medication Dose Route Frequency Provider Last Rate Last Admin    dilTIAZem (CARDIZEM CD) extended release capsule 240 mg  240 mg Oral Daily Vandana Tsai MD   240 mg at 09/02/21 1022    allopurinol (ZYLOPRIM) tablet 100 mg  100 mg Oral Daily Vandaan Tsai MD   100 mg at 09/02/21 0914    sodium chloride flush 0.9 % injection 5-40 mL  5-40 mL IntraVENous 2 times per day Kelby Mosquera MD        sodium chloride flush 0.9 % injection 5-40 mL  5-40 mL IntraVENous PRN Kelby Mosquera MD        0.9 % sodium chloride infusion  25 mL IntraVENous PRN Kelby Mosquera MD        sodium chloride flush 0.9 % Hiram Richard MD        HYDROmorphone HCl PF (DILAUDID) injection 1 mg  1 mg IntraVENous Q4H PRN Hiram Richard MD        melatonin disintegrating tablet 5 mg  5 mg Oral Nightly Hiram Richard MD   5 mg at 09/01/21 2244    indomethacin (INDOCIN) 50 MG suppository    PRN Hiram Richard MD   100 mg at 09/01/21 1703     Allergies: Patient has no known allergies. Family History   Problem Relation Age of Onset   Marilee Solum Cancer Mother     No Known Problems Sister        Social History     Tobacco Use    Smoking status: Former Smoker     Types: Cigarettes    Smokeless tobacco: Never Used    Tobacco comment: quit smoking 30 yrs ago   Substance Use Topics    Alcohol use: Not Currently       Review of Systems   Constitutional: Positive for chills. Negative for fever. HENT: Positive for hearing loss. Negative for congestion and sore throat. Eyes: Negative for pain, redness and visual disturbance. Respiratory: Negative for cough, shortness of breath and wheezing. Cardiovascular: Negative for chest pain and palpitations. Gastrointestinal: Positive for abdominal distention and abdominal pain. Negative for constipation, diarrhea and nausea. Endocrine: Negative for polydipsia and polyphagia. Genitourinary: Negative for dysuria and hematuria. Musculoskeletal: Negative for arthralgias and back pain. Skin: Negative for rash and wound. Neurological: Negative for dizziness, seizures and headaches. Psychiatric/Behavioral: Negative for confusion and dysphoric mood. The patient is not nervous/anxious. Physical Exam  Vitals reviewed. Constitutional:       General: He is not in acute distress. Appearance: He is obese. HENT:      Head: Normocephalic and atraumatic. Nose: Nose normal.   Eyes:      General: Scleral icterus present. Pupils: Pupils are equal, round, and reactive to light. Cardiovascular:      Rate and Rhythm: Tachycardia present. Rhythm irregular.    Pulmonary:      Effort: Pulmonary effort is normal. No respiratory distress. Abdominal:      General: There is no distension. Palpations: Abdomen is soft. There is no mass. Tenderness: There is abdominal tenderness (mild upper abdominal). There is no guarding. Hernia: No hernia is present. Musculoskeletal:         General: No swelling. Normal range of motion. Cervical back: Neck supple. No rigidity. Skin:     General: Skin is warm and dry. Neurological:      General: No focal deficit present. Mental Status: He is alert. Mental status is at baseline. Psychiatric:         Mood and Affect: Mood normal.         Behavior: Behavior normal.           CBC:   Lab Results   Component Value Date    WBC 16.8 09/02/2021    RBC 4.71 09/02/2021    HGB 13.1 09/02/2021    HCT 40.8 09/02/2021    MCV 86.6 09/02/2021    MCH 27.8 09/02/2021    MCHC 32.1 09/02/2021    RDW 15.4 09/02/2021     09/02/2021    MPV 11.9 09/02/2021     CMP:    Lab Results   Component Value Date     09/02/2021    K 4.2 09/02/2021     09/02/2021    CO2 22 09/02/2021    BUN 21 09/02/2021    CREATININE 1.3 09/02/2021    GFRAA >59 09/02/2021    LABGLOM 54 09/02/2021    GLUCOSE 170 09/02/2021    PROT 5.6 09/02/2021    LABALBU 3.5 09/02/2021    CALCIUM 8.8 09/02/2021    BILITOT 4.4 09/02/2021    ALKPHOS 116 09/02/2021     09/02/2021     09/02/2021       Assessment and plan:  67year old male s/p ERCP for choledocholithiasis and cholangitis, with a fib  Discussed with the patient that with the purulence from CBD and likely amount of inflammation, would recommend allowing this to cool down for a period of time, have outpatient follow up, and plan interval cholecystectomy. The patient is also in a fib with a rat in the 130-140s, so better control of that prior to lap rose would also be preferred.  Continue with oral antibiotics and other treatments per the medicine team. Will make sure patient has follow up scheduled at general

## 2021-09-03 NOTE — PROGRESS NOTES
Psychiatric:         Mood and Affect: Mood normal.         Labs/Imaging/Diagnostics    Labs:  CBC:  Recent Labs     09/01/21 1126 09/02/21  0207   WBC 20.1* 16.8*   RBC 5.21 4.71   HGB 14.6 13.1*   HCT 45.1 40.8*   MCV 86.6 86.6   RDW 15.1* 15.4*    121*     CHEMISTRIES:  Recent Labs     09/01/21 1126 09/02/21  0207    135*   K 4.1 4.2    102   CO2 25 22   BUN 16 21   CREATININE 0.9 1.3*   GLUCOSE 110* 170*     PT/INR:  Recent Labs     09/01/21 1126   PROTIME 16.1*   INR 1.28*     APTT:No results for input(s): APTT in the last 72 hours. LIVER PROFILE:  Recent Labs     09/01/21 1126 09/02/21  0207   * 225*   * 343*   BILITOT 7.1* 4.4*   ALKPHOS 134* 116       Imaging Last 24 Hours:  FL ERCP BILIARY S&I    Result Date: 9/1/2021  EXAMINATION: ERCP 9/1/2021 Fluoroscopy time: 503.3 seconds. Dose: 177.63 mGy. 14 images are submitted for interpretation. FINDINGS: An ERCP was performed at endoscopy by Dr. Maryann Raines of the gastroenterology service. A pancreatic duct stent is placed. The ampulla and distal common duct are cannulated with advancement of a guidewire. Subsequent cholangiogram demonstrates mild extrahepatic biliary dilatation with distal filling defect. Stones and sludge were removed. A biliary stent is left in place. 1.. There is mild extrahepatic biliary dilatation. Filling defects within the distal common duct likely represent stone/sludge. These were subsequently removed. A pancreatic duct and common bile duct stent are left in place. The films are available to or for review.  Signed by Dr Karin Downey    ERCP    Result Date: 9/1/2021  No dictation     ECHO 2D WO COLOR DOPPLER COMPLETE    Result Date: 9/1/2021  Transthoracic Echocardiography Report (TTE)  Demographics   Patient Name  Aris Wallace Date of Study          09/01/2021   MRN           [id number]         Gender                 Male   Date of Birth 1945     Room Number            Lakia Max   Age 75 year(s)   Height:       70 inches      Referring Physician    Francesca Pinto   Weight:       198 pounds     Sonographer            Swathi Qiu, [facility]   BSA:          2.08 m^2       Interpreting Physician Sonu Pompa DO   BMI:          28.41 kg/m^2  Procedure Type of Study   TTE procedure:ECHO 2D W/DOPPLER/COLOR/CONTRAST. Study Location: Echo Lab Technical Quality: Adequate visualization Patient Status: Inpatient Contrast Medium: Definity. Amount - 4 ml Rhythm: Atrial fibrillation BP: 140/87 mmHg Indications:Atrial fibrillation. Conclusions   Summary  Rhythm is noted to be atrial fibrillation. Normal left ventricular chamber size and systolic function. Moderate concentric left ventricular hypertrophy. Left ventricular ejection fraction is visually estimated at 65%. Aortic valve leaflets are mildly thickened. There is mildly decreased  excursion. Mild aortic stenosis is present. The peak gradient across the aortic valve is 36 mmHg. The mean gradient across the aortic valve is 19 mmHg. The left atrium is mildly dilated. Signature   ----------------------------------------------------------------  Electronically signed by Sonu Pompa DO(Interpreting  physician) on 09/01/2021 05:56 PM  ----------------------------------------------------------------   Findings   Mitral Valve  Structurally normal mitral valve leaflets. There is no mitral regurgitation. Aortic Valve  Aortic valve leaflets are mildly thickened. There is mildly decreased  excursion. Mild aortic stenosis is present. The peak gradient across the aortic valve is 36 mmHg. The mean gradient across the aortic valve is 19 mmHg. No aortic regurgitation . Tricuspid Valve  Normal tricuspid valve leaflet thickness and excursion. There is trace tricuspid regurgitation. Estimated PA systolic pressure is 52TW mercury. Pulmonic Valve  No significant pulmonic regurgitation. Left Atrium  The left atrium is mildly dilated.    Left Ventricle  Normal left ventricular chamber size and systolic function. Moderate concentric left ventricular hypertrophy. No regional wall motion abnormalities. Left ventricular ejection fraction is visually estimated at 65%. Diastolic function is indeterminate. Right Atrium  Normal right atrial size. Right Ventricle  Normal right ventricular chamber size and function. Pericardial Effusion  No pericardial effusion. Miscellaneous  IVC diameter is normal, suggesting normal right atrial pressure.   M-Mode Measurements (cm)   LVIDd: 4.14 cm                       LVIDs: 2.56 cm  IVSd: 1.37 cm  LVPWd: 1.59 cm                       AO Root Dimension: 1.9 cm  % Ejection Fraction: 65 %            LA: 4.4 cm                                       LVOT: 2 cm  Doppler Measurements:   AV Peak Velocity:300 cm/s            MV Peak E-Wave: 129 cm/s  AV Peak Gradient: 36 mmHg  AV Mean Gradient: 19 mmHg            MV Peak Gradient: 6.66 mmHg  AV Area (Continuity):1.41 cm^2  TR Velocity:326 cm/s  TR Gradient:42.51 mmHg  Estimated RAP:8 mmHg  RVSP:50 mmHg      Assessment//Plan           Hospital Problems         Last Modified POA    Common bile duct (CBD) obstruction 9/1/2021 Yes        Sepsis due to CBD Obstruction   -Sepsis resolved  -Patient status post ERCP with stent placement  -Plan for follow-up with gastroenterology, awaiting surgical consultation for possible planning of cholecystectomy.       Abdominal pain/common bile duct obstruction  -Status post ERCP as noted above, abdominal pain improving.        Atrial Fibrillation with rapid ventricular rate  Discontinue Cardizem drip at 9 AM, transition to oral modality  - Will continue to monitor on telemetry   -TSH within normal limits  -TTEreviewed      Mild aortic stenosis  -Noted on transthoracic echocardiogram     Gastroesophageal reflux disease  -Continue PPI     Essential hypertension  -Chronic condition, continue antihypertensive regimen, routine vitals           EMR Dragon/Transcription disclaimer:   Much of this encounter note is an electronic transcription/translation of spoken language to printed text.  The electronic translation of spoken language may permit erroneous, or at times, nonsensical words or phrases to be inadvertently transcribed; although attempts have made to review the note for such errors, some may still exist.    Electronically signed by   Marvin Hedrick MD   Internal Medicine Hospitalist  On 9/2/2021  At 9:50 AM

## 2021-09-06 LAB — CULTURE, BLOOD 2: NORMAL

## 2021-09-07 NOTE — CONSULTS
Shannon Blair MD        0.9 % sodium chloride infusion  25 mL IntraVENous PRN Karen Whelan MD        sodium chloride flush 0.9 % injection 5-40 mL  5-40 mL IntraVENous 2 times per day Karen Whelan MD        sodium chloride flush 0.9 % injection 5-40 mL  5-40 mL IntraVENous PRN Karen Whelan MD        0.9 % sodium chloride infusion  25 mL IntraVENous PRN Karen Whelan MD        enoxaparin (LOVENOX) injection 40 mg  40 mg SubCUTAneous Daily Karen Whelan MD   40 mg at 09/02/21 4411    ondansetron (ZOFRAN-ODT) disintegrating tablet 4 mg  4 mg Oral Q8H PRN Karen Whelan MD         Or    ondansetron [facility]) injection 4 mg  4 mg IntraVENous Q6H PRN Karen Whelan MD        polyethylene glycol [facility]) packet 17 g  17 g Oral Daily PRN Karen Whelan MD        potassium chloride (KLOR-CON M) extended release tablet 40 mEq  40 mEq Oral PRN Karen Whelan MD         Or    potassium bicarb-citric acid (EFFER-K) effervescent tablet 40 mEq  40 mEq Oral PRN Karen Whelan MD         Or    potassium chloride 10 mEq/100 mL IVPB (Peripheral Line)  10 mEq IntraVENous PRCHU Whelan MD        magnesium sulfate 2000 mg in 50 mL IVPB premix  2,000 mg IntraVENous PRN Karen Whelan MD        sodium chloride flush 0.9 % injection 10 mL  10 mL IntraVENous 2 times per day Karen Whelan MD   10 mL at 09/01/21 2245    sodium chloride flush 0.9 % injection 10 mL  10 mL IntraVENous PRN Karen Whelan MD        0.9 % sodium chloride infusion  25 mL IntraVENous PRN Karen Whelan MD        piperacillin-tazobactam (ZOSYN) 3,375 mg in dextrose 5 % 50 mL IVPB extended infusion (mini-bag)  3,375 mg IntraVENous Q8H Karen Whelan MD 12.5 mL/hr at 09/02/21 1403 3,375 mg at 09/02/21 1403    ibuprofen (ADVIL;MOTRIN) tablet 400 mg  400 mg Oral Q6H PRN Karen Whelan MD        pantoprazole (PROTONIX) injection 40 mg  40 mg IntraVENous Daily Karen Whelan MD   40 mg at 09/02/21 0903     And    sodium chloride (PF) 0.9 % injection 10 mL  10 mL IntraVENous Daily Amie Shaikh MD   10 mL at 09/02/21 0902    indomethacin (INDOCIN) 50 MG suppository 100 mg  100 mg Rectal Once Amie Shaikh MD        HYDROmorphone HCl PF (DILAUDID) injection 1 mg  1 mg IntraVENous Q4H PRN Amie Shaikh MD        melatonin disintegrating tablet 5 mg  5 mg Oral Nightly Amie Shaikh MD   5 mg at 09/01/21 2244    indomethacin (INDOCIN) 50 MG suppository     PRN Amie Shaikh MD   100 mg at 09/01/21 1703         Allergies: Patient has no known allergies.     Family History         Family History   Problem Relation Age of Onset    Cancer Mother      No Known Problems Sister              Social History            Tobacco Use    Smoking status: Former Smoker       Types: Cigarettes    Smokeless tobacco: Never Used    Tobacco comment: quit smoking 30 yrs ago   Substance Use Topics    Alcohol use: Not Currently         Review of Systems   Constitutional: Positive for chills. Negative for fever. HENT: Positive for hearing loss. Negative for congestion and sore throat. Eyes: Negative for pain, redness and visual disturbance. Respiratory: Negative for cough, shortness of breath and wheezing. Cardiovascular: Negative for chest pain and palpitations. Gastrointestinal: Positive for abdominal distention and abdominal pain. Negative for constipation, diarrhea and nausea. Endocrine: Negative for polydipsia and polyphagia. Genitourinary: Negative for dysuria and hematuria. Musculoskeletal: Negative for arthralgias and back pain. Skin: Negative for rash and wound. Neurological: Negative for dizziness, seizures and headaches. Psychiatric/Behavioral: Negative for confusion and dysphoric mood. The patient is not nervous/anxious.          Physical Exam  Vitals reviewed. Constitutional:       General: He is not in acute distress. Appearance: He is obese. HENT:      Head: Normocephalic and atraumatic.       Nose: Nose normal.   Eyes:      General: Scleral icterus present. Pupils: Pupils are equal, round, and reactive to light. Cardiovascular:      Rate and Rhythm: Tachycardia present. Rhythm irregular. Pulmonary:      Effort: Pulmonary effort is normal. No respiratory distress. Abdominal:      General: There is no distension. Palpations: Abdomen is soft. There is no mass. Tenderness: There is abdominal tenderness (mild upper abdominal). There is no guarding. Hernia: No hernia is present. Musculoskeletal:         General: No swelling. Normal range of motion. Cervical back: Neck supple. No rigidity. Skin:     General: Skin is warm and dry. Neurological:      General: No focal deficit present. Mental Status: He is alert. Mental status is at baseline. Psychiatric:         Mood and Affect: Mood normal.         Behavior: Behavior normal.               CBC:         Lab Results   Component Value Date     WBC 16.8 09/02/2021     RBC 4.71 09/02/2021     HGB 13.1 09/02/2021     HCT 40.8 09/02/2021     MCV 86.6 09/02/2021     MCH 27.8 09/02/2021     MCHC 32.1 09/02/2021     RDW 15.4 09/02/2021      09/02/2021     MPV 11.9 09/02/2021      CMP:          Lab Results   Component Value Date      09/02/2021     K 4.2 09/02/2021      09/02/2021     CO2 22 09/02/2021     BUN 21 09/02/2021     CREATININE 1.3 09/02/2021     GFRAA >59 09/02/2021     LABGLOM 54 09/02/2021     GLUCOSE 170 09/02/2021     PROT 5.6 09/02/2021     LABALBU 3.5 09/02/2021     CALCIUM 8.8 09/02/2021     BILITOT 4.4 09/02/2021     ALKPHOS 116 09/02/2021      09/02/2021      09/02/2021         Assessment and plan:  67year old male s/p ERCP for choledocholithiasis and cholangitis, with a fib  Discussed with the patient that with the purulence from CBD and likely amount of inflammation, would recommend allowing this to cool down for a period of time, have outpatient follow up, and plan interval cholecystectomy.  The patient is also in a fib with a rat in the 130-140s, so better control of that prior to lap rose would also be preferred.  Continue with oral antibiotics and other treatments per the medicine team. Will make sure patient has follow up scheduled at general surgery in about 2 weeks.     Dejan Ramirez MD  9/2/2021  8:13 PM

## 2021-09-10 ENCOUNTER — OFFICE VISIT (OUTPATIENT)
Dept: CARDIOLOGY CLINIC | Age: 76
End: 2021-09-10
Payer: COMMERCIAL

## 2021-09-10 VITALS
HEART RATE: 92 BPM | DIASTOLIC BLOOD PRESSURE: 66 MMHG | HEIGHT: 70 IN | SYSTOLIC BLOOD PRESSURE: 102 MMHG | WEIGHT: 202 LBS | BODY MASS INDEX: 28.92 KG/M2

## 2021-09-10 DIAGNOSIS — I35.0 NONRHEUMATIC AORTIC VALVE STENOSIS: ICD-10-CM

## 2021-09-10 DIAGNOSIS — I48.11 LONGSTANDING PERSISTENT ATRIAL FIBRILLATION (HCC): Primary | ICD-10-CM

## 2021-09-10 DIAGNOSIS — I10 ESSENTIAL HYPERTENSION: ICD-10-CM

## 2021-09-10 PROCEDURE — 99204 OFFICE O/P NEW MOD 45 MIN: CPT | Performed by: CLINICAL NURSE SPECIALIST

## 2021-09-10 PROCEDURE — 93000 ELECTROCARDIOGRAM COMPLETE: CPT | Performed by: CLINICAL NURSE SPECIALIST

## 2021-09-10 ASSESSMENT — ENCOUNTER SYMPTOMS
FACIAL SWELLING: 0
NAUSEA: 0
VOMITING: 0
EYE REDNESS: 0
WHEEZING: 0
SHORTNESS OF BREATH: 0
CHEST TIGHTNESS: 0
COUGH: 0
ABDOMINAL PAIN: 0

## 2021-09-10 NOTE — PROGRESS NOTES
[facility], [address], [address]  [address]  Phone: [phone]  Fax: [phone]    OFFICE VISIT:  9/10/2021    Karli Cueto - : 1945    Reason For Visit:  Hayden Mariscal is a 67 y.o. male who is here for Follow-Up from Hospital (Patient is here for follow up from hospital. States he had a high heart rate. ) and Atrial Fibrillation       Diagnosis Orders   1. Longstanding persistent atrial fibrillation ([address]     2. Essential hypertension     3. Nonrheumatic aortic valve stenosis           HPI  Patient was recently hospitalized at [facility] with a common bile duct obstruction. He underwent a ERCP with stone removal with stent insertion. He has a longstanding history of atrial fibrillation and was in a rapid ventricular response during hospitalization. Patient states he has been in atrial fibrillation for some 20 years. He states he had an initial work-up by cardiologist in [place]. He states he had a stress test and heart cath that showed no evidence of CAD. He states his blood thinner was never suggested. He never followed up with the cardiologist after his initial work-up. He does see a PCP on a regular basis, but anticoagulation has never been recommended to him. He denies palpitations or fast heart rates. He denies any unusual dyspnea, chest pain, orthopnea, PND, edema. 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 Denies diabetes, hyperlipidemia. He quit smoking 35 years ago. There is no family history of heart  disease in first-degree relatives     Lyndon Polk is PCP.   Karli Cueto has the following history as recorded in [facility]:    Patient Active Problem List    Diagnosis Date Noted    Common bile duct (CBD) obstruction 2021     Past Medical History:   Diagnosis Date    Arthritis     Chronic kidney disease     COPD (chronic obstructive pulmonary disease) (HCC)     GERD (gastroesophageal reflux disease)     Hypertension     Pneumonia      Past Surgical History:   Procedure Laterality Date    APPENDECTOMY      ERCP N/A 09/01/2021    Dr CARLO Cabreraw/placement of a 10F x 7 cm biliary stent and a 7F x 7 cm pancreatic stent, stone removal-Biliary obstruction due to stone/sludge, 3 month repeat    TONSILLECTOMY       Family History   Problem Relation Age of Onset    Cancer Mother     COPD Father     No Known Problems Sister      Social History     Tobacco Use    Smoking status: Former Smoker     Types: Cigarettes    Smokeless tobacco: Never Used    Tobacco comment: quit smoking 30 yrs ago   Substance Use Topics    Alcohol use: Not Currently      Current Outpatient Medications   Medication Sig Dispense Refill    apixaban (ELIQUIS) 5 MG TABS tablet Take 1 tablet by mouth 2 times daily 60 tablet 5    metoprolol tartrate (LOPRESSOR) 25 MG tablet Take 1 tablet by mouth 2 times daily 60 tablet 3    melatonin 5 MG TBDP disintegrating tablet Take 1 tablet by mouth nightly 30 tablet 0    aspirin EC 81 MG EC tablet Take 1 tablet by mouth daily 90 tablet 1    allopurinol (ZYLOPRIM) 100 MG tablet Take 100 mg by mouth daily Patient takes half a tablet      losartan (COZAAR) 50 MG tablet Take 50 mg by mouth daily      dilTIAZem (CARDIZEM CD) 240 MG extended release capsule Take 240 mg by mouth daily      omeprazole (PRILOSEC) 20 MG delayed release capsule Take 40 mg by mouth daily      colchicine (COLCRYS) 0.6 MG tablet Take 0.6 mg by mouth daily as needed for Pain      Multiple Vitamins-Minerals (THERAPEUTIC MULTIVITAMIN-MINERALS) tablet Take 1 tablet by mouth daily       No current facility-administered medications for this visit. Allergies: Patient has no known allergies. Review of Systems  Review of Systems   Constitutional: Negative for activity change, diaphoresis, fatigue, fever and unexpected weight change. HENT: Negative for facial swelling and nosebleeds. Eyes: Negative for redness and visual disturbance. Respiratory: Negative for cough, chest tightness, shortness of breath and wheezing. Cardiovascular: Negative for chest pain, palpitations and leg swelling. Gastrointestinal: Negative for abdominal pain, nausea and vomiting. Endocrine: Negative for cold intolerance and heat intolerance. Genitourinary: Negative for dysuria and hematuria. Musculoskeletal: Negative for arthralgias and myalgias. Skin: Negative for pallor and rash. Neurological: Negative for dizziness, seizures, syncope, weakness and light-headedness. Hematological: Does not bruise/bleed easily. Psychiatric/Behavioral: Negative for agitation. The patient is not nervous/anxious. Objective  Vital Signs - /66   Pulse 92   Ht 5' 10\" (1.778 m)   Wt 202 lb (91.6 kg)   BMI 28.98 kg/m²   Physical Exam  Vitals and nursing note reviewed. Constitutional:       General: He is not in acute distress. Appearance: He is well-developed. He is not diaphoretic. HENT:      Head: Normocephalic and atraumatic. Right Ear: Hearing and external ear normal.      Left Ear: Hearing and external ear normal.      Nose: Nose normal.   Eyes:      General:         Right eye: No discharge. Left eye: No discharge. Pupils: Pupils are equal, round, and reactive to light. Neck:      Thyroid: No thyromegaly. Vascular: No carotid bruit or JVD. Trachea: No tracheal deviation. Cardiovascular:      Rate and Rhythm: Normal rate. Rhythm irregular. Heart sounds: Normal heart sounds. No murmur heard. No friction rub. No gallop. Pulmonary:      Effort: Pulmonary effort is normal. No respiratory distress. Breath sounds: Normal breath sounds. No wheezing or rales. Abdominal:      Palpations: Abdomen is soft. Tenderness: There is no abdominal tenderness.    Musculoskeletal:         General: No swelling or deformity. Cervical back: Neck supple. No muscular tenderness. Right lower leg: No edema. Left lower leg: No edema. Skin:     General: Skin is warm and dry. Findings: No rash. Neurological:      General: No focal deficit present. Mental Status: He is alert and oriented to person, place, and time. Cranial Nerves: No cranial nerve deficit. Psychiatric:         Mood and Affect: Mood normal.         Behavior: Behavior normal.         Judgment: Judgment normal.         Data:  Lab Results   Component Value Date    WBC 16.8 09/02/2021    RBC 4.71 09/02/2021    HGB 13.1 09/02/2021    HCT 40.8 09/02/2021     09/02/2021      No results found for: CHOL, TRIG, HDL, LDLCALC  Lab Results   Component Value Date     09/02/2021    K 4.2 09/02/2021     09/02/2021    CO2 22 09/02/2021    GLUCOSE 170 09/02/2021    BUN 21 09/02/2021    CREATININE 1.3 09/02/2021    CALCIUM 8.8 09/02/2021     09/02/2021     09/02/2021     Lab Results   Component Value Date    TSH 2.030 09/01/2021     EKG shows atrial fibrillation rate 92    Echo 9/1/21  Summary   Rhythm is noted to be atrial fibrillation. Normal left ventricular chamber size and systolic function. Moderate concentric left ventricular hypertrophy. Left ventricular ejection fraction is visually estimated at 65%. Aortic valve leaflets are mildly thickened. There is mildly decreased   excursion. Mild aortic stenosis is present. The peak gradient across the aortic valve is 36 mmHg. The mean gradient across the aortic valve is 19 mmHg. The left atrium is mildly dilated.       [id number] Score for Atrial Fibrillation Stroke Risk   Risk   Factors  Component Value   C CHF No 0   H HTN Yes 1   A2 Age >= 76 Yes,  (78 y.o.) 2   D DM No 0   S2 Prior Stroke/TIA No 0   V Vascular Disease No 0   A Age 74-69 No,  (78 y.o.) 0   Sc Sex male 0    [id number]  Score  3   Score last updated 9/10/21 8:30 AM CDT    Click here for a link to the UpToDate guideline \"Atrial Fibrillation: Anticoagulation therapy to prevent embolization      Assessment:     Diagnosis Orders   1. Longstanding persistent atrial fibrillation ([address]     2. Essential hypertension     3. Nonrheumatic aortic valve stenosis         Longstanding persistent atrial fibrillation- patient reports he was diagnosed with atrial fibrillation some 20 years ago. He had initial cardiac work-up in [place] at that time, but did not continue to follow-up. Anticoagulation was not recommended by the cardiologist at that time nor his PCP. We discussed stroke risk with atrial fibrillation. His [id number] score is 3 and anticoagulation is recommended. He has had no history of significant bleeding issues or falling issues. Plan will be to start Eliquis 5 mg twice a day. Free 30-day trial card provided. Patient is to communicate with us if this medication is unaffordable and we will work on patient assistance. He may stop his aspirin once he starts the Eliquis. Hypertension-stable on current regimen with metoprolol and diltiazem as well as losartan. Aortic stenosis-mild per recent echo. Reviewed echo findings in detail with patient. Continue to monitor    Plan    Orders Placed This Encounter   Procedures    EKG 12 lead     Order Specific Question:   Reason for Exam?     Answer:   Irregular heart rate     Return in about 2 months (around 11/10/2021) for Dr Lucinda Scott. Start Eliquis 5 mg twice a day for stroke prevention with atrial fibrillation  May stop aspirin once Eliquis is started. Communicate with the office if this medication is unaffordable and we will work on patient assistance    Call with any questionsor concerns  Follow up with Llewellyn Harada for non cardiac problems  Report any new problems  Cardiovascular Fitness-Exercise as tolerated. Strive for 15 minutes of exercise most days of the week.     Cardiac / HealthyDiet  Continue current medications as directed  Continue plan of treatment  It is always recommended that you bring your medicationsbottles with you to each visit - this is for your safety!        Washington Ugarte, APRN

## 2021-09-10 NOTE — PATIENT INSTRUCTIONS
Start Eliquis 5mg twice a day- call if unaffordable  Free 30 days trial card provided  May stop aspirin once Eliquis is started    Patient Education        Atrial Fibrillation: Care Instructions  Your Care Instructions     Atrial fibrillation is an irregular and often fast heartbeat. Treating this condition is important for several reasons. It can cause blood clots, which can travel from your heart to your brain and cause a stroke. If you have a fast heartbeat, you may feel lightheaded, dizzy, and weak. An irregular heartbeat can also increase your risk for heart failure. Atrial fibrillation is often the result of another heart condition, such as high blood pressure or coronary artery disease. Making changes to improve your heart condition will help you stay healthy and active. Follow-up care is a key part of your treatment and safety. Be sure to make and go to all appointments, and call your doctor if you are having problems. It's also a good idea to know your test results and keep a list of the medicines you take. How can you care for yourself at home? Medicines    · Take your medicines exactly as prescribed. Call your doctor if you think you are having a problem with your medicine. You will get more details on the specific medicines your doctor prescribes.     · If your doctor has given you a blood thinner to prevent a stroke, be sure you get instructions about how to take your medicine safely. Blood thinners can cause serious bleeding problems.     · Do not take any vitamins, over-the-counter drugs, or herbal products without talking to your doctor first.   Lifestyle changes    · Do not smoke. Smoking can increase your chance of a stroke and heart attack. If you need help quitting, talk to your doctor about stop-smoking programs and medicines. These can increase your chances of quitting for good.     · Eat a heart-healthy diet.     · Stay at a healthy weight.  Lose weight if you need to.     · Limit alcohol to 2 drinks a day for men and 1 drink a day for women. Too much alcohol can cause health problems.     · Avoid colds and flu. Get a pneumococcal vaccine shot. If you have had one before, ask your doctor whether you need another dose. Get a flu shot every year. If you must be around people with colds or flu, wash your hands often. Activity    · If your doctor recommends it, get more exercise. Walking is a good choice. Bit by bit, increase the amount you walk every day. Try for at least 30 minutes on most days of the week. You also may want to swim, bike, or do other activities. Your doctor may suggest that you join a cardiac rehabilitation program so that you can have help increasing your physical activity safely.     · Start light exercise if your doctor says it is okay. Even a small amount will help you get stronger, have more energy, and manage stress. Walking is an easy way to get exercise. Start out by walking a little more than you did in the hospital. Gradually increase the amount you walk.     · When you exercise, watch for signs that your heart is working too hard. You are pushing too hard if you cannot talk while you are exercising. If you become short of breath or dizzy or have chest pain, sit down and rest immediately.     · Check your pulse regularly. Place two fingers on the artery at the palm side of your wrist, in line with your thumb. If your heartbeat seems uneven or fast, talk to your doctor. When should you call for help? Call 911 anytime you think you may need emergency care. For example, call if:    · You have symptoms of a heart attack. These may include:  ? Chest pain or pressure, or a strange feeling in the chest.  ? Sweating. ? Shortness of breath. ? Nausea or vomiting. ? Pain, pressure, or a strange feeling in the back, neck, jaw, or upper belly or in one or both shoulders or arms. ? Lightheadedness or sudden weakness. ? A fast or irregular heartbeat.   After you call 911, the  may tell you to chew 1 adult-strength or 2 to 4 low-dose aspirin. Wait for an ambulance. Do not try to drive yourself.     · You have symptoms of a stroke. These may include:  ? Sudden numbness, tingling, weakness, or loss of movement in your face, arm, or leg, especially on only one side of your body. ? Sudden vision changes. ? Sudden trouble speaking. ? Sudden confusion or trouble understanding simple statements. ? Sudden problems with walking or balance. ? A sudden, severe headache that is different from past headaches.     · You passed out (lost consciousness). Call your doctor now or seek immediate medical care if:    · You have new or increased shortness of breath.     · You feel dizzy or lightheaded, or you feel like you may faint.     · Your heart rate becomes irregular.     · You can feel your heart flutter in your chest or skip heartbeats. Tell your doctor if these symptoms are new or worse. Watch closely for changes in your health, and be sure to contact your doctor if you have any problems. Where can you learn more? Go to https://CloudCheckr.M86 Security. org and sign in to your Kuli Kuli account. Enter U020 in the Mind on Games box to learn more about \"Atrial Fibrillation: Care Instructions. \"     If you do not have an account, please click on the \"Sign Up Now\" link. Current as of: August 31, 2020               Content Version: 12.9  © 2006-2021 [employer]. Care instructions adapted under license by [facility] ([facility]). If you have questions about a medical condition or this instruction, always ask your healthcare professional. [place] [address] any warranty or liability for your use of this information. Patient Education        Aortic Valve Stenosis: Care Instructions  Overview     Having aortic valve stenosis means that the valve between your heart and the large blood vessel that carries blood to the body (aorta) has narrowed.  That forces the heart to pump harder to get enough blood through the valve. As stenosis gets worse, the valve gets narrower. This can cause symptoms. Symptoms include chest pain, dizziness, fainting, or shortness of breath. Your doctor will check your heart regularly. You may take medicine to lower blood pressure or cholesterol. If the stenosis gets worse, you may choose to have the valve replaced. Follow-up care is a key part of your treatment and safety. Be sure to make and go to all appointments, and call your doctor if you are having problems. It's also a good idea to know your test results and keep a list of the medicines you take. How can you care for yourself at home? · Be safe with medicines. Take your medicines exactly as prescribed. Call your doctor if you think you are having a problem with your medicine. · Call your doctor if you have new symptoms or your symptoms get worse. · Eat heart-healthy foods. These include vegetables, fruits, nuts, beans, lean meat, fish, and whole grains. Limit sodium, alcohol, and sugar. · Be active. Ask your doctor what type and level of exercise is safe for you. · Do not smoke. · Stay at a healthy weight. Lose weight if you need to. · Manage other health problems. If you think you may have a problem with alcohol or drug use, talk to your doctor. · Avoid colds and flu. Get a pneumococcal vaccine shot if you need to. Get a flu vaccine every year. · Take care of your teeth and gums. Get regular dental checkups. Good dental health is important because bacteria can spread from infected teeth and gums to the heart valves. When should you call for help? Call 911 anytime you think you may need emergency care. For example, call if:    · You passed out (lost consciousness).     · You have symptoms of a heart attack. These may include:  ? Chest pain or pressure, or a strange feeling in the chest.  ? Sweating. ? Shortness of breath. ? Nausea or vomiting.   ? Pain, pressure, or a strange feeling in the back, neck, jaw, or upper belly or in one or both shoulders or arms. ? Lightheadedness or sudden weakness. ? A fast or irregular heartbeat. After you call 911, the  may tell you to chew 1 adult-strength or 2 to 4 low-dose aspirin. Wait for an ambulance. Do not try to drive yourself. Call your doctor now or seek immediate medical care if:    · You have new symptoms or your symptoms get worse.     · You have new or increased shortness of breath.     · You are dizzy or lightheaded, or you feel like you may faint.     · You have sudden weight gain, such as more than 2 to 3 pounds in a day or 5 pounds in a week. (Your doctor may suggest a different range of weight gain.)     · You have new or increased swelling in your legs, ankles, or feet.     · You are suddenly so tired or weak that you cannot do your usual activities. Watch closely for changes in your health, and be sure to contact your doctor if you have any problems. Where can you learn more? Go to https://Tango Health.WalkHub. org and sign in to your PopUp [employer]asing account. Enter S497 in the KylesCredport box to learn more about \"Aortic Valve Stenosis: Care Instructions. \"     If you do not have an account, please click on the \"Sign Up Now\" link. Current as of: August 31, 2020               Content Version: 12.9  © 7481-9755 [employer]. Care instructions adapted under license by [facility] ([facility]). If you have questions about a medical condition or this instruction, always ask your healthcare professional. [place] [address] any warranty or liability for your use of this information.

## 2021-09-13 ENCOUNTER — TELEPHONE (OUTPATIENT)
Dept: GASTROENTEROLOGY | Age: 76
End: 2021-09-13

## 2021-09-13 NOTE — TELEPHONE ENCOUNTER
Got a call from [facility] asking for the order to be sent to [phone]. I have faxed this. Per patient request, I have faxed his KUB order to [facility] @ [phone], phone is [phone].  They are open 8-4:30, but they take the last patient @ 3:45 PM. PT is aware.    ----- Message from fernando Do [address] [place] [address] Kate Romero sent at 9/2/2021  8:47 AM CDT -----  Regarding: Pt needs KUB in 2 weeks (around 9/15/21) order made and mailed, call patient

## 2021-09-14 DIAGNOSIS — K83.1 COMMON BILE DUCT (CBD) OBSTRUCTION: ICD-10-CM

## 2021-09-14 DIAGNOSIS — K83.1 COMMON BILE DUCT (CBD) OBSTRUCTION: Primary | ICD-10-CM

## 2021-09-14 NOTE — TELEPHONE ENCOUNTER
PT advised that his pancreatic stent is still in place. He will need to repeat the KUB in 2 weeks. Order will be [facility]ed to [facility], same place as before.

## 2021-09-16 ENCOUNTER — OFFICE VISIT (OUTPATIENT)
Dept: SURGERY | Age: 76
End: 2021-09-16
Payer: COMMERCIAL

## 2021-09-16 VITALS
BODY MASS INDEX: 28.66 KG/M2 | HEIGHT: 70 IN | WEIGHT: 200.2 LBS | OXYGEN SATURATION: 98 % | TEMPERATURE: 97.4 F | HEART RATE: 68 BPM

## 2021-09-16 DIAGNOSIS — K81.1 CHRONIC CHOLECYSTITIS: Primary | ICD-10-CM

## 2021-09-16 PROCEDURE — 99212 OFFICE O/P EST SF 10 MIN: CPT | Performed by: SURGERY

## 2021-09-16 NOTE — LETTER
Preop Orders:     Patient:  Trae Stone  : 1945    Hospital: [facility]    Admitting Physician:  Dr. Ruth Escobedo    Diagnosis: chronic cholecystitis, h/o ERCP    Procedure: laparoscopic robot assisted cholecystectomy with firefly ICG    Time: 1.5 hours    Anesthesia: General    Admission: Outpatient     Date: to be scheduled-- in two weeks    Labs: per anesthesia     Special Instructions:  none    Cardiac Clearance:  will contact cardiology about holding eliquis    Special Equipment:  none    Pre-Op Meds:  none    Latex Allergy: no    Beta Blocker: yes - metoprolol    Medication Instructions: will contact cardiology for instructions about eliquis if they want him to have lovenox bridge    Post op visit: 2 weeks post op      Electronically signed by Margo King MD   On 21   @ 10:45 AM

## 2021-09-16 NOTE — PROGRESS NOTES
Mr. Donato Boxer is a 67year old male who was previously admitted with cholangitis and CBD stones, s/p ERCP. He reports that he is doing okay. He has less energy and appetite than usual. He denies any abdominal pain. He was seen at cardiology and started on eliquis. Past Medical History:   Diagnosis Date    Arthritis     Chronic kidney disease     COPD (chronic obstructive pulmonary disease) (HCC)     Emphysema of lung ([address]     GERD (gastroesophageal reflux disease)     Hypertension     Pneumonia      Past Surgical History:   Procedure Laterality Date    APPENDECTOMY      ERCP N/A 09/01/2021    Dr CARLO Bowling-w/placement of a 10F x 7 cm biliary stent and a 7F x 7 cm pancreatic stent, stone removal-Biliary obstruction due to stone/sludge, 3 month repeat    TONSILLECTOMY       Current Outpatient Medications   Medication Sig Dispense Refill    apixaban (ELIQUIS) 5 MG TABS tablet Take 1 tablet by mouth 2 times daily 60 tablet 5    metoprolol tartrate (LOPRESSOR) 25 MG tablet Take 1 tablet by mouth 2 times daily 60 tablet 3    melatonin 5 MG TBDP disintegrating tablet Take 1 tablet by mouth nightly 30 tablet 0    allopurinol (ZYLOPRIM) 100 MG tablet Take 100 mg by mouth daily Patient takes half a tablet      losartan (COZAAR) 50 MG tablet Take 50 mg by mouth daily      dilTIAZem (CARDIZEM CD) 240 MG extended release capsule Take 240 mg by mouth daily      omeprazole (PRILOSEC) 20 MG delayed release capsule Take 40 mg by mouth daily      colchicine (COLCRYS) 0.6 MG tablet Take 0.6 mg by mouth daily as needed for Pain      Multiple Vitamins-Minerals (THERAPEUTIC MULTIVITAMIN-MINERALS) tablet Take 1 tablet by mouth daily      aspirin EC 81 MG EC tablet Take 1 tablet by mouth daily (Patient not taking: Reported on 9/16/2021) 90 tablet 1     No current facility-administered medications for this visit. Allergies: Patient has no known allergies.     Family History   Problem Relation Age of Onset    Cancer Mother     COPD Father     No Known Problems Sister        Social History     Tobacco Use    Smoking status: Former Smoker     Types: Cigarettes    Smokeless tobacco: Never Used    Tobacco comment: quit smoking 30 yrs ago   Substance Use Topics    Alcohol use: Not Currently       Review of Systems   Constitutional: Negative for fever or chills  HENT: Positive for hearing loss. Negative for congestion and sore throat.    Eyes: Negative for pain, redness and visual disturbance. Respiratory: Negative for cough, shortness of breath and wheezing.    Cardiovascular: Negative for chest pain and palpitations. Gastrointestinal:  Negative for constipation, diarrhea and nausea or abdominal pain   Endocrine: Negative for polydipsia and polyphagia. Genitourinary: Negative for dysuria and hematuria. Musculoskeletal: Negative for arthralgias and back pain. Skin: Negative for rash and wound. Neurological: Negative for dizziness, seizures and headaches. Psychiatric/Behavioral: Negative for confusion and dysphoric mood. The patient is not nervous/anxious.      Physical Exam  Vitals reviewed. Constitutional:       General: He is not in acute distress. HENT:      Head: Normocephalic and atraumatic. Eyes:      General: No scleral icterus. Pupils: Pupils are equal, round, and reactive to light. Cardiovascular:      Rate and Rhythm: Rhythm irregular. Pulmonary:      Effort: Pulmonary effort is normal. No respiratory distress. Abdominal:      General: There is no distension. Palpations: Abdomen is soft. Tenderness: There is no abdominal tenderness. Musculoskeletal:         General: No swelling. Normal range of motion. Cervical back: Neck supple. No rigidity. Skin:     General: Skin is warm and dry. Neurological:      General: No focal deficit present. Mental Status: He is alert. Mental status is at baseline.    Psychiatric:         Mood and Affect: Mood normal.         Behavior: Behavior normal.           Assessment and plan:  67year old male s/p ERCP for CBD stones and cholangitis  Discussed laparoscopic robot assisted cholecystectomy. Risks and benefits of surgery were discussed, including but not limited to, bleeding, infection, injury so surrounding structures, need for open surgery, and post operative diarrhea. Will contact cardiology about holding eliquis. Patient expressed understanding and agrees to going ahead and scheduling surgery.     Jayden Ortiz MD  9/16/2021  10:59 AM

## 2021-09-17 NOTE — OP NOTE
Endoscopic Procedure Note    Patient: Heuy Console : 1945  Med Rec#: [id number] Acc#: [id number]     Primary Care Provider Jcarlos Barbara  Referring Provider: Angela Hansen MD    Endoscopist: Silas Robertson MD    Date of Procedure:  2021     Procedure:   1. ERCP with stone extraction  2. ERCP with biliary stent placement   3. ERCP with pancreatic stent placement   4. Intra procedure interpretation of biliary /pancreatic fluoroscopy    Indications:   1. Abnormal imaging  2. Elevated LFTs    Anesthesia:  General     Estimated Blood Loss: minimal    Procedure:   Prior to the procedure the patient's chart was reviewed and informed consent was obtained. Risk and Benefits (Risks including but not limited to bleeding, perforation, infection, 8 to 10% risk of post ERCP pancreatitis, and even death) were discussed with the patient. They were agreeable to continue. Patient was brought to the operating room, underwent general anesthesia, and placed in the prone position. A side-viewing duodenoscope was advanced from the oropharynx down to the distal duodenum and reduced into a short position opposite the ampulla. The ampulla appeared normal. A  film was obtained which appeared normal.     Initial attempts at biliary cannulation resulted in pancreatic cannulation. After second attempt, a 2 wire technique was used and biliary cannulation was achieved. The bile duct was then cannulated using a 0.025 x 260 cm straight Revolution Jagwire. A short nose traction sphincterotome was advanced over the wire and the bile duct was deeply cannulated. Contrast was injected. I personally interpreted the bile duct images. The flow of contrast was adequate. Contrast injection showed filling defects c/w choledocholithiasis. The pancreatic duct appeared normal with normal passage of the guidewire.      To help discover objects an approximate 1 cm biliary sphincterotomy was made using a monofilament autotome and electrocautery. There was minimal post sphincterotomy bleeding. The bile duct was swept multiple times starting the bifurcation with a 12mm biliary extraction balloon. Multiple stones and evidence of pus was removed. Stent placement - a 10F x 7cm plastic biliary stent was placed due to presence of cholangitis. Attention was then turned to the pancreatic wire. A prophylactic 5F x 5cm pigtail stent was placed 4cm into the pancreatic duct for prevention of post ERCP pancreatitis. At the end of the procedure the biliary tree was draining well. IMPRESSION:  1. S/p balloon extraction of choledocholithiasis   2. S/p placement of 10F biliary stent  3. S/p placement of 5 F prophylactic stent     RECOMMENDATIONS:    1. Clear liquid diet today with advancing diet tomorrow as tolerated. 2.  Repeat ERCP in 3 months for biliary stent removal.   3.  Check XRay of abdomen in 2-4 weeks to check passage of pancreatic stent. The results were discussed with the patient and family. A copy of the images obtained were given to the patient.      Kasey Vazquez MD  9/16/2021  10:24 PM

## 2021-09-20 ENCOUNTER — TELEPHONE (OUTPATIENT)
Dept: CARDIOLOGY CLINIC | Age: 76
End: 2021-09-20

## 2021-09-20 NOTE — TELEPHONE ENCOUNTER
Okay to hold Eliquis 48 hours prior to procedure and resume thereafter.   Patient was told he could discontinue aspirin once Eliquis was started at last office visit

## 2021-09-20 NOTE — TELEPHONE ENCOUNTER
Date: 10-1-21    Cardiologist: Dr. Megan Coronado    Procedure: lap rose    Surgeon: Dr. Lance Thomson    Last Office Visit: 9-10-21    Reason for office visit and medical concerns addressed at this office visit: A-Fib, nonrheumatic aortic valve stenosis, CKD, COPD    Testing Performed and Date of Service:  EKG 9-10-21  Echo 9-1-21    Does the patient have a stent? If so, what type? Not in last year     Current Medications: Eliquis, lopressor, melatonin 5 mg, ASA, zyloprim, cozaar, cardizem CD, Prilosec, colcrys,     Is the patient currently taking an anticoagulant? If so, what is the diagnosis the patient has been given to warrant the need for the anticoagulant?  Eliquis, and ASA    Additional Notes: Med hold on ELiquis and ASA

## 2021-09-27 ENCOUNTER — TELEPHONE (OUTPATIENT)
Dept: GASTROENTEROLOGY | Age: 76
End: 2021-09-27

## 2021-09-27 DIAGNOSIS — K83.1 COMMON BILE DUCT (CBD) OBSTRUCTION: ICD-10-CM

## 2021-09-27 NOTE — TELEPHONE ENCOUNTER
I called and reminded patient that he needs KUB 9/27/21, order has been faxed to outside facility, but he may also do it here when he does other testing on Wednesday, prior to his GB surgery.

## 2021-09-28 ENCOUNTER — TELEPHONE (OUTPATIENT)
Dept: GASTROENTEROLOGY | Age: 76
End: 2021-09-28

## 2021-09-28 NOTE — TELEPHONE ENCOUNTER
Patient scheduled for ERCP stent removal on 10/13/21 - he is on Eliquis through [facility] - I am sending a clearance to them. Patient will get Covid tested on Monday 10/11/21 between 7- Noon.   He is aware of medications to take the morning of, NPO after midnight and he will need a

## 2021-09-29 ENCOUNTER — HOSPITAL ENCOUNTER (OUTPATIENT)
Dept: PREADMISSION TESTING | Age: 76
Discharge: HOME | End: 2021-10-03
Payer: MEDICARE

## 2021-09-29 VITALS — BODY MASS INDEX: 28.2 KG/M2 | HEIGHT: 70 IN | WEIGHT: 197 LBS

## 2021-09-29 LAB — SARS-COV-2, PCR: NOT DETECTED

## 2021-09-29 PROCEDURE — U0005 INFEC AGEN DETEC AMPLI PROBE: HCPCS

## 2021-09-29 PROCEDURE — U0003 INFECTIOUS AGENT DETECTION BY NUCLEIC ACID (DNA OR RNA); SEVERE ACUTE RESPIRATORY SYNDROME CORONAVIRUS 2 (SARS-COV-2) (CORONAVIRUS DISEASE [COVID-19]), AMPLIFIED PROBE TECHNIQUE, MAKING USE OF HIGH THROUGHPUT TECHNOLOGIES AS DESCRIBED BY CMS-2020-01-R: HCPCS

## 2021-09-29 RX ORDER — CHLORAL HYDRATE 500 MG
1 CAPSULE ORAL DAILY
COMMUNITY

## 2021-10-01 ENCOUNTER — ANESTHESIA EVENT (OUTPATIENT)
Dept: OPERATING ROOM | Age: 76
End: 2021-10-01
Payer: MEDICARE

## 2021-10-01 ENCOUNTER — PREP FOR PROCEDURE (OUTPATIENT)
Dept: SURGERY | Age: 76
End: 2021-10-01

## 2021-10-01 ENCOUNTER — ANESTHESIA (OUTPATIENT)
Dept: OPERATING ROOM | Age: 76
End: 2021-10-01
Payer: MEDICARE

## 2021-10-01 ENCOUNTER — HOSPITAL ENCOUNTER (OUTPATIENT)
Age: 76
Setting detail: OUTPATIENT SURGERY
Discharge: HOME | End: 2021-10-01
Attending: SURGERY | Admitting: SURGERY
Payer: MEDICARE

## 2021-10-01 VITALS
TEMPERATURE: 97.7 F | OXYGEN SATURATION: 96 % | BODY MASS INDEX: 28.2 KG/M2 | HEART RATE: 88 BPM | DIASTOLIC BLOOD PRESSURE: 75 MMHG | RESPIRATION RATE: 16 BRPM | SYSTOLIC BLOOD PRESSURE: 127 MMHG | HEIGHT: 70 IN | WEIGHT: 197 LBS

## 2021-10-01 VITALS
DIASTOLIC BLOOD PRESSURE: 67 MMHG | TEMPERATURE: 96.3 F | SYSTOLIC BLOOD PRESSURE: 122 MMHG | OXYGEN SATURATION: 95 % | RESPIRATION RATE: 14 BRPM

## 2021-10-01 DIAGNOSIS — K81.2 ACUTE ON CHRONIC CHOLECYSTITIS: Primary | ICD-10-CM

## 2021-10-01 PROCEDURE — 7100000010 HC PHASE II RECOVERY - FIRST 15 MIN: Performed by: SURGERY

## 2021-10-01 PROCEDURE — 3700000001 HC ADD 15 MINUTES (ANESTHESIA): Performed by: SURGERY

## 2021-10-01 PROCEDURE — 2500000003 HC RX 250 WO HCPCS: Performed by: SURGERY

## 2021-10-01 PROCEDURE — 6370000000 HC RX 637 (ALT 250 FOR IP): Performed by: SURGERY

## 2021-10-01 PROCEDURE — 47562 LAPAROSCOPIC CHOLECYSTECTOMY: CPT | Performed by: SURGERY

## 2021-10-01 PROCEDURE — 7100000000 HC PACU RECOVERY - FIRST 15 MIN: Performed by: SURGERY

## 2021-10-01 PROCEDURE — 7100000011 HC PHASE II RECOVERY - ADDTL 15 MIN: Performed by: SURGERY

## 2021-10-01 PROCEDURE — 6360000002 HC RX W HCPCS: Performed by: ANESTHESIOLOGY

## 2021-10-01 PROCEDURE — 2580000003 HC RX 258: Performed by: ANESTHESIOLOGY

## 2021-10-01 PROCEDURE — S2900 ROBOTIC SURGICAL SYSTEM: HCPCS | Performed by: SURGERY

## 2021-10-01 PROCEDURE — 3600000019 HC SURGERY ROBOT ADDTL 15MIN: Performed by: SURGERY

## 2021-10-01 PROCEDURE — 3600000009 HC SURGERY ROBOT BASE: Performed by: SURGERY

## 2021-10-01 PROCEDURE — 2500000003 HC RX 250 WO HCPCS: Performed by: REGISTERED NURSE

## 2021-10-01 PROCEDURE — 2709999900 HC NON-CHARGEABLE SUPPLY: Performed by: SURGERY

## 2021-10-01 PROCEDURE — 88304 TISSUE EXAM BY PATHOLOGIST: CPT

## 2021-10-01 PROCEDURE — 6360000002 HC RX W HCPCS: Performed by: REGISTERED NURSE

## 2021-10-01 PROCEDURE — 3700000000 HC ANESTHESIA ATTENDED CARE: Performed by: SURGERY

## 2021-10-01 PROCEDURE — 7100000001 HC PACU RECOVERY - ADDTL 15 MIN: Performed by: SURGERY

## 2021-10-01 RX ORDER — SODIUM CHLORIDE 9 MG/ML
25 INJECTION, SOLUTION INTRAVENOUS PRN
Status: DISCONTINUED | OUTPATIENT
Start: 2021-10-01 | End: 2021-10-01 | Stop reason: HOSPADM

## 2021-10-01 RX ORDER — SODIUM CHLORIDE 0.9 % (FLUSH) 0.9 %
5-40 SYRINGE (ML) INJECTION EVERY 12 HOURS SCHEDULED
Status: DISCONTINUED | OUTPATIENT
Start: 2021-10-01 | End: 2021-10-01 | Stop reason: HOSPADM

## 2021-10-01 RX ORDER — METOCLOPRAMIDE HYDROCHLORIDE 5 MG/ML
10 INJECTION INTRAMUSCULAR; INTRAVENOUS
Status: DISCONTINUED | OUTPATIENT
Start: 2021-10-01 | End: 2021-10-01 | Stop reason: HOSPADM

## 2021-10-01 RX ORDER — MIDAZOLAM HYDROCHLORIDE 1 MG/ML
2 INJECTION INTRAMUSCULAR; INTRAVENOUS
Status: DISCONTINUED | OUTPATIENT
Start: 2021-10-01 | End: 2021-10-01 | Stop reason: HOSPADM

## 2021-10-01 RX ORDER — MEPERIDINE HYDROCHLORIDE 25 MG/ML
12.5 INJECTION INTRAMUSCULAR; INTRAVENOUS; SUBCUTANEOUS EVERY 5 MIN PRN
Status: DISCONTINUED | OUTPATIENT
Start: 2021-10-01 | End: 2021-10-01 | Stop reason: HOSPADM

## 2021-10-01 RX ORDER — LIDOCAINE HYDROCHLORIDE 10 MG/ML
1 INJECTION, SOLUTION EPIDURAL; INFILTRATION; INTRACAUDAL; PERINEURAL
Status: DISCONTINUED | OUTPATIENT
Start: 2021-10-01 | End: 2021-10-01 | Stop reason: HOSPADM

## 2021-10-01 RX ORDER — HYDRALAZINE HYDROCHLORIDE 20 MG/ML
5 INJECTION INTRAMUSCULAR; INTRAVENOUS EVERY 10 MIN PRN
Status: DISCONTINUED | OUTPATIENT
Start: 2021-10-01 | End: 2021-10-01 | Stop reason: HOSPADM

## 2021-10-01 RX ORDER — MORPHINE SULFATE 4 MG/ML
4 INJECTION, SOLUTION INTRAMUSCULAR; INTRAVENOUS EVERY 5 MIN PRN
Status: DISCONTINUED | OUTPATIENT
Start: 2021-10-01 | End: 2021-10-01 | Stop reason: HOSPADM

## 2021-10-01 RX ORDER — PROPOFOL 10 MG/ML
INJECTION, EMULSION INTRAVENOUS PRN
Status: DISCONTINUED | OUTPATIENT
Start: 2021-10-01 | End: 2021-10-01 | Stop reason: SDUPTHER

## 2021-10-01 RX ORDER — ROCURONIUM BROMIDE 10 MG/ML
INJECTION, SOLUTION INTRAVENOUS PRN
Status: DISCONTINUED | OUTPATIENT
Start: 2021-10-01 | End: 2021-10-01 | Stop reason: SDUPTHER

## 2021-10-01 RX ORDER — HYDROCODONE BITARTRATE AND ACETAMINOPHEN 5; 325 MG/1; MG/1
2 TABLET ORAL EVERY 4 HOURS PRN
Status: DISCONTINUED | OUTPATIENT
Start: 2021-10-01 | End: 2021-10-01 | Stop reason: HOSPADM

## 2021-10-01 RX ORDER — PROMETHAZINE HYDROCHLORIDE 25 MG/ML
6.25 INJECTION, SOLUTION INTRAMUSCULAR; INTRAVENOUS
Status: DISCONTINUED | OUTPATIENT
Start: 2021-10-01 | End: 2021-10-01 | Stop reason: HOSPADM

## 2021-10-01 RX ORDER — LABETALOL HYDROCHLORIDE 5 MG/ML
5 INJECTION, SOLUTION INTRAVENOUS EVERY 10 MIN PRN
Status: DISCONTINUED | OUTPATIENT
Start: 2021-10-01 | End: 2021-10-01 | Stop reason: HOSPADM

## 2021-10-01 RX ORDER — ONDANSETRON 2 MG/ML
4 INJECTION INTRAMUSCULAR; INTRAVENOUS EVERY 6 HOURS PRN
Status: CANCELLED | OUTPATIENT
Start: 2021-10-01

## 2021-10-01 RX ORDER — SODIUM CHLORIDE 0.9 % (FLUSH) 0.9 %
5-40 SYRINGE (ML) INJECTION PRN
Status: DISCONTINUED | OUTPATIENT
Start: 2021-10-01 | End: 2021-10-01 | Stop reason: HOSPADM

## 2021-10-01 RX ORDER — ONDANSETRON 4 MG/1
4 TABLET, ORALLY DISINTEGRATING ORAL EVERY 8 HOURS PRN
Status: CANCELLED | OUTPATIENT
Start: 2021-10-01

## 2021-10-01 RX ORDER — DEXMEDETOMIDINE HYDROCHLORIDE 100 UG/ML
INJECTION, SOLUTION INTRAVENOUS PRN
Status: DISCONTINUED | OUTPATIENT
Start: 2021-10-01 | End: 2021-10-01 | Stop reason: SDUPTHER

## 2021-10-01 RX ORDER — INDOCYANINE GREEN AND WATER 25 MG
KIT INJECTION PRN
Status: DISCONTINUED | OUTPATIENT
Start: 2021-10-01 | End: 2021-10-01 | Stop reason: ALTCHOICE

## 2021-10-01 RX ORDER — ONDANSETRON 4 MG/1
4 TABLET, ORALLY DISINTEGRATING ORAL EVERY 8 HOURS PRN
Qty: 10 TABLET | Refills: 2 | Status: SHIPPED | OUTPATIENT
Start: 2021-10-01

## 2021-10-01 RX ORDER — SODIUM CHLORIDE 0.9 % (FLUSH) 0.9 %
5-40 SYRINGE (ML) INJECTION PRN
Status: CANCELLED | OUTPATIENT
Start: 2021-10-01

## 2021-10-01 RX ORDER — SODIUM CHLORIDE 9 MG/ML
25 INJECTION, SOLUTION INTRAVENOUS PRN
Status: CANCELLED | OUTPATIENT
Start: 2021-10-01

## 2021-10-01 RX ORDER — MORPHINE SULFATE 2 MG/ML
2 INJECTION, SOLUTION INTRAMUSCULAR; INTRAVENOUS EVERY 5 MIN PRN
Status: DISCONTINUED | OUTPATIENT
Start: 2021-10-01 | End: 2021-10-01 | Stop reason: HOSPADM

## 2021-10-01 RX ORDER — MORPHINE SULFATE 4 MG/ML
4 INJECTION, SOLUTION INTRAMUSCULAR; INTRAVENOUS
Status: DISCONTINUED | OUTPATIENT
Start: 2021-10-01 | End: 2021-10-01 | Stop reason: HOSPADM

## 2021-10-01 RX ORDER — SODIUM CHLORIDE 0.9 % (FLUSH) 0.9 %
5-40 SYRINGE (ML) INJECTION EVERY 12 HOURS SCHEDULED
Status: CANCELLED | OUTPATIENT
Start: 2021-10-01

## 2021-10-01 RX ORDER — LIDOCAINE HYDROCHLORIDE 10 MG/ML
INJECTION, SOLUTION EPIDURAL; INFILTRATION; INTRACAUDAL; PERINEURAL PRN
Status: DISCONTINUED | OUTPATIENT
Start: 2021-10-01 | End: 2021-10-01 | Stop reason: SDUPTHER

## 2021-10-01 RX ORDER — SCOLOPAMINE TRANSDERMAL SYSTEM 1 MG/1
1 PATCH, EXTENDED RELEASE TRANSDERMAL ONCE
Status: DISCONTINUED | OUTPATIENT
Start: 2021-10-01 | End: 2021-10-01 | Stop reason: HOSPADM

## 2021-10-01 RX ORDER — HYDROMORPHONE HYDROCHLORIDE 1 MG/ML
0.25 INJECTION, SOLUTION INTRAMUSCULAR; INTRAVENOUS; SUBCUTANEOUS EVERY 5 MIN PRN
Status: DISCONTINUED | OUTPATIENT
Start: 2021-10-01 | End: 2021-10-01 | Stop reason: HOSPADM

## 2021-10-01 RX ORDER — HYDROMORPHONE HYDROCHLORIDE 1 MG/ML
0.5 INJECTION, SOLUTION INTRAMUSCULAR; INTRAVENOUS; SUBCUTANEOUS EVERY 5 MIN PRN
Status: DISCONTINUED | OUTPATIENT
Start: 2021-10-01 | End: 2021-10-01 | Stop reason: HOSPADM

## 2021-10-01 RX ORDER — FENTANYL CITRATE 50 UG/ML
50 INJECTION, SOLUTION INTRAMUSCULAR; INTRAVENOUS
Status: DISCONTINUED | OUTPATIENT
Start: 2021-10-01 | End: 2021-10-01 | Stop reason: HOSPADM

## 2021-10-01 RX ORDER — BUPIVACAINE HYDROCHLORIDE 2.5 MG/ML
INJECTION, SOLUTION INFILTRATION; PERINEURAL PRN
Status: DISCONTINUED | OUTPATIENT
Start: 2021-10-01 | End: 2021-10-01 | Stop reason: ALTCHOICE

## 2021-10-01 RX ORDER — DIPHENHYDRAMINE HYDROCHLORIDE 50 MG/ML
12.5 INJECTION INTRAMUSCULAR; INTRAVENOUS
Status: DISCONTINUED | OUTPATIENT
Start: 2021-10-01 | End: 2021-10-01 | Stop reason: HOSPADM

## 2021-10-01 RX ORDER — SODIUM CHLORIDE, SODIUM LACTATE, POTASSIUM CHLORIDE, CALCIUM CHLORIDE 600; 310; 30; 20 MG/100ML; MG/100ML; MG/100ML; MG/100ML
INJECTION, SOLUTION INTRAVENOUS CONTINUOUS
Status: DISCONTINUED | OUTPATIENT
Start: 2021-10-01 | End: 2021-10-01 | Stop reason: HOSPADM

## 2021-10-01 RX ORDER — HYDROCODONE BITARTRATE AND ACETAMINOPHEN 5; 325 MG/1; MG/1
1 TABLET ORAL EVERY 4 HOURS PRN
Status: DISCONTINUED | OUTPATIENT
Start: 2021-10-01 | End: 2021-10-01 | Stop reason: HOSPADM

## 2021-10-01 RX ORDER — FENTANYL CITRATE 50 UG/ML
INJECTION, SOLUTION INTRAMUSCULAR; INTRAVENOUS PRN
Status: DISCONTINUED | OUTPATIENT
Start: 2021-10-01 | End: 2021-10-01 | Stop reason: SDUPTHER

## 2021-10-01 RX ORDER — ONDANSETRON 2 MG/ML
INJECTION INTRAMUSCULAR; INTRAVENOUS PRN
Status: DISCONTINUED | OUTPATIENT
Start: 2021-10-01 | End: 2021-10-01

## 2021-10-01 RX ORDER — MORPHINE SULFATE 4 MG/ML
2 INJECTION, SOLUTION INTRAMUSCULAR; INTRAVENOUS
Status: DISCONTINUED | OUTPATIENT
Start: 2021-10-01 | End: 2021-10-01 | Stop reason: HOSPADM

## 2021-10-01 RX ORDER — DEXAMETHASONE SODIUM PHOSPHATE 10 MG/ML
INJECTION, SOLUTION INTRAMUSCULAR; INTRAVENOUS PRN
Status: DISCONTINUED | OUTPATIENT
Start: 2021-10-01 | End: 2021-10-01 | Stop reason: SDUPTHER

## 2021-10-01 RX ORDER — HYDROCODONE BITARTRATE AND ACETAMINOPHEN 5; 325 MG/1; MG/1
2 TABLET ORAL EVERY 6 HOURS PRN
Qty: 24 TABLET | Refills: 0 | Status: SHIPPED | OUTPATIENT
Start: 2021-10-01 | End: 2021-10-06

## 2021-10-01 RX ADMIN — ROCURONIUM BROMIDE 60 MG: 10 INJECTION, SOLUTION INTRAVENOUS at 08:17

## 2021-10-01 RX ADMIN — FENTANYL CITRATE 50 MCG: 50 INJECTION, SOLUTION INTRAMUSCULAR; INTRAVENOUS at 08:45

## 2021-10-01 RX ADMIN — MORPHINE SULFATE 4 MG: 4 INJECTION, SOLUTION INTRAMUSCULAR; INTRAVENOUS at 10:19

## 2021-10-01 RX ADMIN — DEXMEDETOMIDINE HYDROCHLORIDE 8 MCG: 100 INJECTION, SOLUTION, CONCENTRATE INTRAVENOUS at 08:44

## 2021-10-01 RX ADMIN — DEXAMETHASONE SODIUM PHOSPHATE 10 MG: 10 INJECTION, SOLUTION INTRAMUSCULAR; INTRAVENOUS at 08:33

## 2021-10-01 RX ADMIN — FENTANYL CITRATE 50 MCG: 50 INJECTION, SOLUTION INTRAMUSCULAR; INTRAVENOUS at 08:43

## 2021-10-01 RX ADMIN — PHENYLEPHRINE HYDROCHLORIDE 200 MCG: 10 INJECTION INTRAVENOUS at 08:25

## 2021-10-01 RX ADMIN — PHENYLEPHRINE HYDROCHLORIDE 200 MCG: 10 INJECTION INTRAVENOUS at 09:11

## 2021-10-01 RX ADMIN — HYDROCODONE BITARTRATE AND ACETAMINOPHEN 1 TABLET: 5; 325 TABLET ORAL at 11:07

## 2021-10-01 RX ADMIN — SODIUM CHLORIDE, SODIUM LACTATE, POTASSIUM CHLORIDE, AND CALCIUM CHLORIDE: 600; 310; 30; 20 INJECTION, SOLUTION INTRAVENOUS at 08:11

## 2021-10-01 RX ADMIN — SODIUM CHLORIDE, SODIUM LACTATE, POTASSIUM CHLORIDE, AND CALCIUM CHLORIDE: 600; 310; 30; 20 INJECTION, SOLUTION INTRAVENOUS at 09:29

## 2021-10-01 RX ADMIN — PROPOFOL 150 MG: 10 INJECTION, EMULSION INTRAVENOUS at 08:17

## 2021-10-01 RX ADMIN — LIDOCAINE HYDROCHLORIDE 50 MG: 10 INJECTION, SOLUTION EPIDURAL; INFILTRATION; INTRACAUDAL; PERINEURAL at 08:15

## 2021-10-01 RX ADMIN — PHENYLEPHRINE HYDROCHLORIDE 200 MCG: 10 INJECTION INTRAVENOUS at 09:05

## 2021-10-01 RX ADMIN — FENTANYL CITRATE 100 MCG: 50 INJECTION, SOLUTION INTRAMUSCULAR; INTRAVENOUS at 08:16

## 2021-10-01 RX ADMIN — DEXMEDETOMIDINE HYDROCHLORIDE 8 MCG: 100 INJECTION, SOLUTION, CONCENTRATE INTRAVENOUS at 08:47

## 2021-10-01 RX ADMIN — PHENYLEPHRINE HYDROCHLORIDE 200 MCG: 10 INJECTION INTRAVENOUS at 08:31

## 2021-10-01 RX ADMIN — ONDANSETRON HYDROCHLORIDE 4 MG: 2 INJECTION, SOLUTION INTRAMUSCULAR; INTRAVENOUS at 09:29

## 2021-10-01 RX ADMIN — FENTANYL CITRATE 50 MCG: 50 INJECTION, SOLUTION INTRAMUSCULAR; INTRAVENOUS at 09:27

## 2021-10-01 ASSESSMENT — PAIN SCALES - GENERAL
PAINLEVEL_OUTOF10: 4
PAINLEVEL_OUTOF10: 0
PAINLEVEL_OUTOF10: 7
PAINLEVEL_OUTOF10: 4
PAINLEVEL_OUTOF10: 4

## 2021-10-01 ASSESSMENT — PAIN DESCRIPTION - DESCRIPTORS
DESCRIPTORS: ACHING
DESCRIPTORS: ACHING

## 2021-10-01 ASSESSMENT — PAIN DESCRIPTION - PAIN TYPE
TYPE: SURGICAL PAIN
TYPE: SURGICAL PAIN

## 2021-10-01 ASSESSMENT — PAIN DESCRIPTION - LOCATION
LOCATION: ABDOMEN
LOCATION: ABDOMEN

## 2021-10-01 ASSESSMENT — LIFESTYLE VARIABLES: SMOKING_STATUS: 0

## 2021-10-01 ASSESSMENT — ENCOUNTER SYMPTOMS: SHORTNESS OF BREATH: 0

## 2021-10-01 NOTE — H&P (VIEW-ONLY)
Mr. Christina Mckinnon is a 67year old male who was previously admitted with cholangitis and CBD stones, s/p ERCP. He reports that he is doing okay. He has less energy and appetite than usual. He denies any abdominal pain.  He was seen at cardiology and started on eliquis.      Past Medical History        Past Medical History:   Diagnosis Date    Arthritis      Chronic kidney disease      COPD (chronic obstructive pulmonary disease) ([address]      Emphysema of lung ([address]      GERD (gastroesophageal reflux disease)      Hypertension      Pneumonia           Past Surgical History         Past Surgical History:   Procedure Laterality Date    APPENDECTOMY        ERCP N/A 09/01/2021     Dr CARLO Bowling-w/placement of a 10F x 7 cm biliary stent and a 7F x 7 cm pancreatic stent, stone removal-Biliary obstruction due to stone/sludge, 3 month repeat    TONSILLECTOMY             Current Facility-Administered Medications          Current Outpatient Medications   Medication Sig Dispense Refill    apixaban (ELIQUIS) 5 MG TABS tablet Take 1 tablet by mouth 2 times daily 60 tablet 5    metoprolol tartrate (LOPRESSOR) 25 MG tablet Take 1 tablet by mouth 2 times daily 60 tablet 3    melatonin 5 MG TBDP disintegrating tablet Take 1 tablet by mouth nightly 30 tablet 0    allopurinol (ZYLOPRIM) 100 MG tablet Take 100 mg by mouth daily Patient takes half a tablet        losartan (COZAAR) 50 MG tablet Take 50 mg by mouth daily        dilTIAZem (CARDIZEM CD) 240 MG extended release capsule Take 240 mg by mouth daily        omeprazole (PRILOSEC) 20 MG delayed release capsule Take 40 mg by mouth daily        colchicine (COLCRYS) 0.6 MG tablet Take 0.6 mg by mouth daily as needed for Pain        Multiple Vitamins-Minerals (THERAPEUTIC MULTIVITAMIN-MINERALS) tablet Take 1 tablet by mouth daily        aspirin EC 81 MG EC tablet Take 1 tablet by mouth daily (Patient not taking: Reported on 9/16/2021) 90 tablet 1      No current facility-administered medications for this visit.         Allergies: Patient has no known allergies.     Family History         Family History   Problem Relation Age of Onset    Cancer Mother      COPD Father      No Known Problems Sister              Social History            Tobacco Use    Smoking status: Former Smoker       Types: Cigarettes    Smokeless tobacco: Never Used    Tobacco comment: quit smoking 30 yrs ago   Substance Use Topics    Alcohol use: Not Currently         Review of Systems   Constitutional: Negative for fever or chills  HENT: Positive for hearing loss. Negative for congestion and sore throat.    Eyes: Negative for pain, redness and visual disturbance. Respiratory: Negative for cough, shortness of breath and wheezing.    Cardiovascular: Negative for chest pain and palpitations. Gastrointestinal:  Negative for constipation, diarrhea and nausea or abdominal pain   Endocrine: Negative for polydipsia and polyphagia. Genitourinary: Negative for dysuria and hematuria. Musculoskeletal: Negative for arthralgias and back pain. Skin: Negative for rash and wound. Neurological: Negative for dizziness, seizures and headaches. Psychiatric/Behavioral: Negative for confusion and dysphoric mood. The patient is not nervous/anxious.       Physical Exam  Vitals reviewed. Constitutional:       General: He is not in acute distress. HENT:      Head: Normocephalic and atraumatic. Eyes:      General: No scleral icterus. Pupils: Pupils are equal, round, and reactive to light. Cardiovascular:      Rate and Rhythm: Rhythm irregular. Pulmonary:      Effort: Pulmonary effort is normal. No respiratory distress. Abdominal:      General: There is no distension. Palpations: Abdomen is soft. Tenderness: There is no abdominal tenderness. Musculoskeletal:         General: No swelling. Normal range of motion. Cervical back: Neck supple. No rigidity.    Skin:     General: Skin is warm and dry. Neurological:      General: No focal deficit present. Mental Status: He is alert. Mental status is at baseline. Psychiatric:         Mood and Affect: Mood normal.         Behavior: Behavior normal.               Assessment and plan:  67year old male s/p ERCP for CBD stones and cholangitis  Discussed laparoscopic robot assisted cholecystectomy. Risks and benefits of surgery were discussed, including but not limited to, bleeding, infection, injury so surrounding structures, need for open surgery, and post operative diarrhea. Will contact cardiology about holding eliquis.  Patient expressed understanding and agrees to going ahead and scheduling surgery.     Yin Gonzalez MD  9/16/2021  10:59 AM

## 2021-10-01 NOTE — ANESTHESIA POSTPROCEDURE EVALUATION
Department of Anesthesiology  Postprocedure Note    Patient: Maida Cheatham  MRN: [id number]  YOB: 1945  Date of evaluation: 10/1/2021  Time:  9:55 AM     Procedure Summary     Date: 10/01/21 Room / Location: [address]    Anesthesia Start: 0078 Anesthesia Stop:     Procedure: LAPAROSCOPIC ROBOT ASSISTED CHOLECYSTECTOMY WITH FIREFLY ICG (N/A ) Diagnosis: (CHRONIC CHOLECYSTITIS, H/O ERCP)    Surgeons: Emre Mcrae MD Responsible Provider: ELYSE Burgess CRNA    Anesthesia Type: general ASA Status: 3          Anesthesia Type: No value filed. Cali Phase I: Cali Score: 10    Cali Phase II:      Last vitals: Reviewed and per EMR flowsheets.        Anesthesia Post Evaluation    Patient location during evaluation: PACU  Patient participation: complete - patient participated  Level of consciousness: sleepy but conscious  Pain score: 0  Airway patency: patent  Nausea & Vomiting: no vomiting and no nausea  Complications: no  Cardiovascular status: blood pressure returned to baseline  Respiratory status: acceptable  Hydration status: stable

## 2021-10-01 NOTE — DISCHARGE INSTR - DIET

## 2021-10-01 NOTE — ANESTHESIA PRE PROCEDURE
Arthritis     Chronic kidney disease     COPD (chronic obstructive pulmonary disease) (HCC)     Emphysema of lung (HCC)     GERD (gastroesophageal reflux disease)     Hypertension     Pneumonia        Past Surgical History:        Procedure Laterality Date    APPENDECTOMY      ERCP N/A 09/01/2021    Dr CARLO Cabreraw/placement of a 10F x 7 cm biliary stent and a 7F x 7 cm pancreatic stent, stone removal-Biliary obstruction due to stone/sludge, 3 month repeat    TONSILLECTOMY         Social History:    Social History     Tobacco Use    Smoking status: Former Smoker     Types: Cigarettes    Smokeless tobacco: Never Used    Tobacco comment: quit smoking 30 yrs ago   Substance Use Topics    Alcohol use: Not Currently                                Counseling given: Not Answered  Comment: quit smoking 30 yrs ago      Vital Signs (Current):   Vitals:    10/01/21 0647   BP: (!) 154/90   Pulse: 81   Resp: 16   Temp: 98.2 °F (36.8 °C)   TempSrc: Temporal   SpO2: 98%   Weight: 197 lb (89.4 kg)   Height: 5' 10\" (1.778 m)                                              BP Readings from Last 3 Encounters:   10/01/21 (!) 154/90   09/10/21 102/66   09/03/21 134/75       NPO Status: Time of last liquid consumption: 2300                        Time of last solid consumption: 2300                        Date of last liquid consumption: 09/30/21                        Date of last solid food consumption: 09/30/21    BMI:   Wt Readings from Last 3 Encounters:   10/01/21 197 lb (89.4 kg)   09/29/21 197 lb (89.4 kg)   09/16/21 200 lb 3.2 oz (90.8 kg)     Body mass index is 28.27 kg/m².     CBC:   Lab Results   Component Value Date    WBC 16.8 09/02/2021    RBC 4.71 09/02/2021    HGB 13.1 09/02/2021    HCT 40.8 09/02/2021    MCV 86.6 09/02/2021    RDW 15.4 09/02/2021     09/02/2021       CMP:   Lab Results   Component Value Date     09/02/2021    K 4.2 09/02/2021     09/02/2021    CO2 22 09/02/2021    BUN 21 09/02/2021    CREATININE 1.3 09/02/2021    GFRAA >59 09/02/2021    LABGLOM 54 09/02/2021    GLUCOSE 170 09/02/2021    PROT 5.6 09/02/2021    CALCIUM 8.8 09/02/2021    BILITOT 4.4 09/02/2021    ALKPHOS 116 09/02/2021     09/02/2021     09/02/2021       POC Tests: No results for input(s): POCGLU, POCNA, POCK, POCCL, POCBUN, POCHEMO, POCHCT in the last 72 hours. Coags:   Lab Results   Component Value Date    PROTIME 16.1 09/01/2021    INR 1.28 09/01/2021       HCG (If Applicable): No results found for: PREGTESTUR, PREGSERUM, HCG, HCGQUANT     ABGs: No results found for: PHART, PO2ART, APA9CLF, TBU1SQD, BEART, M7WTVEIB     Type & Screen (If Applicable):  No results found for: LABABO, LABRH    Drug/Infectious Status (If Applicable):  No results found for: HIV, HEPCAB    COVID-19 Screening (If Applicable):   Lab Results   Component Value Date    COVID19 Not Detected 09/29/2021           Anesthesia Evaluation  Patient summary reviewed and Nursing notes reviewed no history of anesthetic complications:   Airway: Mallampati: II  TM distance: >3 FB   Neck ROM: full  Mouth opening: > = 3 FB Dental: normal exam   (+) caps      Pulmonary:Negative Pulmonary ROS and normal exam  breath sounds clear to auscultation  (+) pneumonia:  COPD:      (-) shortness of breath and not a current smoker          Patient did not smoke on day of surgery.                  Cardiovascular:    (+) hypertension:, valvular problems/murmurs: AS, murmur,     (-) CAD,  angina and  CHF    NYHA Classification: I  ECG reviewed  Rhythm: regular  Rate: normal           Beta Blocker:  Not on Beta Blocker      ROS comment: Date of Birth 1945     Room Number            [address]      Age           67 year(s)      Height:       70 inches      Referring Physician    Brenda Waller      Weight:       198 pounds     Sonographer            Swathi Qiu, [facility]      BSA:          2.08 m^2       Interpreting Physician Samuel Jones DO      BMI: 28.41 kg/m^2     Procedure     Type of Study      TTE procedure:ECHO 2D W/DOPPLER/COLOR/CONTRAST. Study Location: Echo Lab  Technical Quality: Adequate visualization     Patient Status: Inpatient     Contrast Medium: Definity. Amount - 4 ml     Rhythm: Atrial fibrillation BP: 140/87 mmHg     Indications:Atrial fibrillation.      Conclusions      Summary   Rhythm is noted to be atrial fibrillation. Normal left ventricular chamber size and systolic function. Moderate concentric left ventricular hypertrophy. Left ventricular ejection fraction is visually estimated at 65%. Aortic valve leaflets are mildly thickened. There is mildly decreased   excursion. Mild aortic stenosis is present. The peak gradient across the aortic valve is 36 mmHg. The mean gradient across the aortic valve is 19 mmHg. The left atrium is mildly dilated. Signature      ----------------------------------------------------------------     Neuro/Psych:   Negative Neuro/Psych ROS     (-) seizures, CVA and depression/anxiety            GI/Hepatic/Renal: Neg GI/Hepatic/Renal ROS  (+) GERD:, liver disease:,      (-) hiatal hernia       Endo/Other: Negative Endo/Other ROS             Pt had PAT visit. Abdominal:       Abdomen: soft. Vascular: Other Findings:           Anesthesia Plan      general     ASA 3     (Iv zofran within 30 min of closing )  Induction: intravenous. BIS  MIPS: Postoperative opioids intended and Prophylactic antiemetics administered. Anesthetic plan and risks discussed with patient. Use of blood products discussed with patient whom. Plan discussed with CRNA.     Attending anesthesiologist reviewed and agrees with Pre Eval content              Vee Cervantes MD   10/1/2021

## 2021-10-01 NOTE — H&P
Mr. Robinson Zamorano is a 67year old male who was previously admitted with cholangitis and CBD stones, s/p ERCP. He reports that he is doing okay. He has less energy and appetite than usual. He denies any abdominal pain.  He was seen at cardiology and started on eliquis.      Past Medical History        Past Medical History:   Diagnosis Date    Arthritis      Chronic kidney disease      COPD (chronic obstructive pulmonary disease) ([address]      Emphysema of lung ([address]      GERD (gastroesophageal reflux disease)      Hypertension      Pneumonia           Past Surgical History         Past Surgical History:   Procedure Laterality Date    APPENDECTOMY        ERCP N/A 09/01/2021     Dr CARLO Bowling-w/placement of a 10F x 7 cm biliary stent and a 7F x 7 cm pancreatic stent, stone removal-Biliary obstruction due to stone/sludge, 3 month repeat    TONSILLECTOMY             Current Facility-Administered Medications          Current Outpatient Medications   Medication Sig Dispense Refill    apixaban (ELIQUIS) 5 MG TABS tablet Take 1 tablet by mouth 2 times daily 60 tablet 5    metoprolol tartrate (LOPRESSOR) 25 MG tablet Take 1 tablet by mouth 2 times daily 60 tablet 3    melatonin 5 MG TBDP disintegrating tablet Take 1 tablet by mouth nightly 30 tablet 0    allopurinol (ZYLOPRIM) 100 MG tablet Take 100 mg by mouth daily Patient takes half a tablet        losartan (COZAAR) 50 MG tablet Take 50 mg by mouth daily        dilTIAZem (CARDIZEM CD) 240 MG extended release capsule Take 240 mg by mouth daily        omeprazole (PRILOSEC) 20 MG delayed release capsule Take 40 mg by mouth daily        colchicine (COLCRYS) 0.6 MG tablet Take 0.6 mg by mouth daily as needed for Pain        Multiple Vitamins-Minerals (THERAPEUTIC MULTIVITAMIN-MINERALS) tablet Take 1 tablet by mouth daily        aspirin EC 81 MG EC tablet Take 1 tablet by mouth daily (Patient not taking: Reported on 9/16/2021) 90 tablet 1      No current facility-administered medications for this visit.         Allergies: Patient has no known allergies.     Family History         Family History   Problem Relation Age of Onset    Cancer Mother      COPD Father      No Known Problems Sister              Social History            Tobacco Use    Smoking status: Former Smoker       Types: Cigarettes    Smokeless tobacco: Never Used    Tobacco comment: quit smoking 30 yrs ago   Substance Use Topics    Alcohol use: Not Currently         Review of Systems   Constitutional: Negative for fever or chills  HENT: Positive for hearing loss. Negative for congestion and sore throat.    Eyes: Negative for pain, redness and visual disturbance. Respiratory: Negative for cough, shortness of breath and wheezing.    Cardiovascular: Negative for chest pain and palpitations. Gastrointestinal:  Negative for constipation, diarrhea and nausea or abdominal pain   Endocrine: Negative for polydipsia and polyphagia. Genitourinary: Negative for dysuria and hematuria. Musculoskeletal: Negative for arthralgias and back pain. Skin: Negative for rash and wound. Neurological: Negative for dizziness, seizures and headaches. Psychiatric/Behavioral: Negative for confusion and dysphoric mood. The patient is not nervous/anxious.       Physical Exam  Vitals reviewed. Constitutional:       General: He is not in acute distress. HENT:      Head: Normocephalic and atraumatic. Eyes:      General: No scleral icterus. Pupils: Pupils are equal, round, and reactive to light. Cardiovascular:      Rate and Rhythm: Rhythm irregular. Pulmonary:      Effort: Pulmonary effort is normal. No respiratory distress. Abdominal:      General: There is no distension. Palpations: Abdomen is soft. Tenderness: There is no abdominal tenderness. Musculoskeletal:         General: No swelling. Normal range of motion. Cervical back: Neck supple. No rigidity.    Skin:     General: Skin is warm and dry. Neurological:      General: No focal deficit present. Mental Status: He is alert. Mental status is at baseline. Psychiatric:         Mood and Affect: Mood normal.         Behavior: Behavior normal.               Assessment and plan:  67year old male s/p ERCP for CBD stones and cholangitis  Discussed laparoscopic robot assisted cholecystectomy. Risks and benefits of surgery were discussed, including but not limited to, bleeding, infection, injury so surrounding structures, need for open surgery, and post operative diarrhea. Will contact cardiology about holding eliquis.  Patient expressed understanding and agrees to going ahead and scheduling surgery.     Cristian Velásquez MD  9/16/2021  10:59 AM

## 2021-10-01 NOTE — BRIEF OP NOTE
Brief Postoperative Note      Patient:  Liana Duvall  YOB: 1945  MRN: [id number]    Date of Procedure: 10/1/2021    Pre-Op Diagnosis: CHRONIC CHOLECYSTITIS, H/O ERCP    Post-Op Diagnosis: Same       Procedure(s):  LAPAROSCOPIC ROBOT ASSISTED CHOLECYSTECTOMY WITH FIREFLY ICG    Surgeon(s):  Justin Collazo MD    Assistant:  First Assistant: Haleigh Dye RN    Anesthesia: General    Estimated Blood Loss (mL): Minimal    Complications: None    Specimens:   ID Type Source Tests Collected by Time Destination   A : gallbladder Tissue Gallbladder SURGICAL PATHOLOGY Justin Collazo MD 10/1/2021 8866        Implants:  * No implants in log *      Drains: * No LDAs found *    Findings: continued edema and dilitation    Electronically signed by Justin Collazo MD on 10/1/2021 at 9:43 AM

## 2021-10-01 NOTE — DISCHARGE INSTR - ACTIVITY
Activity as tolerated except no lifting more than 10 pounds for the first week and no driving if taking pain medication. Okay to shower over incisions, but do not submerge under water like a tub or pool. Watch for redness, drainage, fever, or worsening abdominal pain, and call for any questions or concerns.

## 2021-10-01 NOTE — INTERVAL H&P NOTE
Update History & Physical    The patient's History and Physical of September 16, 2021 was reviewed with the patient and I examined the patient. There was no change. The surgical site was confirmed by the patient and me. Plan: The risks, benefits, expected outcome, and alternative to the recommended procedure have been discussed with the patient. Patient understands and wants to proceed with the procedure.      Electronically signed by Yin Gonzalez MD on 10/1/2021 at 8:05 AM

## 2021-10-05 NOTE — OP NOTE
OPERATIVE REPORT    PATIENT NAME: Lisbet Louis  MEDICAL RECORD NO. [id number]  SURGEON: Kelly Srivastava MD MD   Primary Care Physician: Remigio Jose  Date: 10/1/2021    PREOPERATIVE DIAGNOSIS  Symptomatic Cholelithiasis with chronic cholecystitis    POSTOPERATIVE DIAGNOSIS  Same    PROCEDURE  Robotic Assisted Laparoscopic cholecystectomy with Firefly (ICG)    ASSISTANT Hans Mosley    Anesthesia: GEA    EBL: Minimal    Findings: continued edema and dilitation    INDICATIONS  The patient presented with a complaint of RUQ abdominal pain. US was consistent with gallstones. PROCEDURE  After informed consent was obtained, the patient was taken to the operating room and placed in supine position. After the induction of adequate general endotracheal anesthesia the abdomen was prepped in the usual sterile fashion. Time out performed. Local anesthestic was administered to the inferior portion of the umbilicus, an incision was made, the base of the umbilicus was elevated, and the veress needle was inserted. The abdominal cavity was insufflated and the 8 mm port was inserted. The laparoscope was advanced and inspection undertaken. There was no evidence of trauma from the trocar insertion. Eight millimeter working ports were placed in line with the umbilicus, two ports on the patient's right and one on the left side. The patient was placed in reverse trendelenberg position and rotated to the left. The [address] operating system was brought into the field and docked. Working instruments were advanced and the fundus of the gallbladder was grasped and elevated. The peritoneum over the neck of the gallbladder was incised and dissection was begun. Firefly vision was used to visualize the cystic duct and biliary anatomy. The neck of the gallbladder was dissected with blunt and electorcautery to visualize the cystic duct. This was cleared of  surrounding tissue.  Adjacent to this the cystic artery was identified

## 2021-10-11 ENCOUNTER — OFFICE VISIT (OUTPATIENT)
Age: 76
End: 2021-10-11

## 2021-10-11 ENCOUNTER — TELEPHONE (OUTPATIENT)
Dept: GASTROENTEROLOGY | Age: 76
End: 2021-10-11

## 2021-10-11 DIAGNOSIS — Z11.59 SCREENING FOR VIRAL DISEASE: Primary | ICD-10-CM

## 2021-10-11 LAB — SARS-COV-2, PCR: NOT DETECTED

## 2021-10-11 PROCEDURE — 99999 PR OFFICE/OUTPT VISIT,PROCEDURE ONLY: CPT | Performed by: NURSE PRACTITIONER

## 2021-10-11 NOTE — TELEPHONE ENCOUNTER
Patient is aware that we are not in network with his insurance and will have a higher out of pocket.   He has spoken to his insurance and is aware

## 2021-10-13 ENCOUNTER — ANESTHESIA EVENT (OUTPATIENT)
Dept: ENDOSCOPY | Age: 76
End: 2021-10-13
Payer: MEDICARE

## 2021-10-13 ENCOUNTER — APPOINTMENT (OUTPATIENT)
Dept: GENERAL RADIOLOGY | Age: 76
End: 2021-10-13
Attending: INTERNAL MEDICINE
Payer: MEDICARE

## 2021-10-13 ENCOUNTER — HOSPITAL ENCOUNTER (OUTPATIENT)
Age: 76
Setting detail: OUTPATIENT SURGERY
Discharge: HOME | End: 2021-10-13
Attending: INTERNAL MEDICINE | Admitting: INTERNAL MEDICINE
Payer: MEDICARE

## 2021-10-13 ENCOUNTER — ANESTHESIA (OUTPATIENT)
Dept: ENDOSCOPY | Age: 76
End: 2021-10-13
Payer: MEDICARE

## 2021-10-13 VITALS
RESPIRATION RATE: 20 BRPM | SYSTOLIC BLOOD PRESSURE: 152 MMHG | WEIGHT: 197 LBS | BODY MASS INDEX: 28.2 KG/M2 | TEMPERATURE: 97.9 F | DIASTOLIC BLOOD PRESSURE: 90 MMHG | HEART RATE: 89 BPM | HEIGHT: 70 IN | OXYGEN SATURATION: 96 %

## 2021-10-13 VITALS — SYSTOLIC BLOOD PRESSURE: 115 MMHG | TEMPERATURE: 97.8 F | DIASTOLIC BLOOD PRESSURE: 56 MMHG | OXYGEN SATURATION: 94 %

## 2021-10-13 PROCEDURE — 74328 X-RAY BILE DUCT ENDOSCOPY: CPT

## 2021-10-13 PROCEDURE — 2580000003 HC RX 258: Performed by: NURSE ANESTHETIST, CERTIFIED REGISTERED

## 2021-10-13 PROCEDURE — 7100000001 HC PACU RECOVERY - ADDTL 15 MIN: Performed by: INTERNAL MEDICINE

## 2021-10-13 PROCEDURE — 3609015000 HC ERCP REMOVE FOREIGN BODY/STENT BILIARY/PANC DUCT: Performed by: INTERNAL MEDICINE

## 2021-10-13 PROCEDURE — 2709999900 HC NON-CHARGEABLE SUPPLY: Performed by: INTERNAL MEDICINE

## 2021-10-13 PROCEDURE — 2720000010 HC SURG SUPPLY STERILE: Performed by: INTERNAL MEDICINE

## 2021-10-13 PROCEDURE — 43264 ERCP REMOVE DUCT CALCULI: CPT | Performed by: INTERNAL MEDICINE

## 2021-10-13 PROCEDURE — 6370000000 HC RX 637 (ALT 250 FOR IP): Performed by: INTERNAL MEDICINE

## 2021-10-13 PROCEDURE — 43275 ERCP REMOVE FORGN BODY DUCT: CPT | Performed by: INTERNAL MEDICINE

## 2021-10-13 PROCEDURE — 7100000000 HC PACU RECOVERY - FIRST 15 MIN: Performed by: INTERNAL MEDICINE

## 2021-10-13 PROCEDURE — 3700000000 HC ANESTHESIA ATTENDED CARE: Performed by: INTERNAL MEDICINE

## 2021-10-13 PROCEDURE — 6360000002 HC RX W HCPCS: Performed by: NURSE ANESTHETIST, CERTIFIED REGISTERED

## 2021-10-13 PROCEDURE — 74328 X-RAY BILE DUCT ENDOSCOPY: CPT | Performed by: INTERNAL MEDICINE

## 2021-10-13 PROCEDURE — 43262 ENDO CHOLANGIOPANCREATOGRAPH: CPT | Performed by: INTERNAL MEDICINE

## 2021-10-13 PROCEDURE — 3700000001 HC ADD 15 MINUTES (ANESTHESIA): Performed by: INTERNAL MEDICINE

## 2021-10-13 PROCEDURE — 2580000003 HC RX 258: Performed by: INTERNAL MEDICINE

## 2021-10-13 PROCEDURE — C1769 GUIDE WIRE: HCPCS | Performed by: INTERNAL MEDICINE

## 2021-10-13 PROCEDURE — 2500000003 HC RX 250 WO HCPCS: Performed by: NURSE ANESTHETIST, CERTIFIED REGISTERED

## 2021-10-13 PROCEDURE — 3209999900 FLUORO FOR SURGICAL PROCEDURES

## 2021-10-13 PROCEDURE — 7100000010 HC PHASE II RECOVERY - FIRST 15 MIN: Performed by: INTERNAL MEDICINE

## 2021-10-13 RX ORDER — ROCURONIUM BROMIDE 10 MG/ML
INJECTION, SOLUTION INTRAVENOUS PRN
Status: DISCONTINUED | OUTPATIENT
Start: 2021-10-13 | End: 2021-10-13 | Stop reason: SDUPTHER

## 2021-10-13 RX ORDER — PROPOFOL 10 MG/ML
INJECTION, EMULSION INTRAVENOUS PRN
Status: DISCONTINUED | OUTPATIENT
Start: 2021-10-13 | End: 2021-10-13 | Stop reason: SDUPTHER

## 2021-10-13 RX ORDER — ONDANSETRON 2 MG/ML
INJECTION INTRAMUSCULAR; INTRAVENOUS PRN
Status: DISCONTINUED | OUTPATIENT
Start: 2021-10-13 | End: 2021-10-13 | Stop reason: SDUPTHER

## 2021-10-13 RX ORDER — ENALAPRILAT 2.5 MG/2ML
1.25 INJECTION INTRAVENOUS
Status: DISCONTINUED | OUTPATIENT
Start: 2021-10-13 | End: 2021-10-13 | Stop reason: HOSPADM

## 2021-10-13 RX ORDER — MORPHINE SULFATE 2 MG/ML
2 INJECTION, SOLUTION INTRAMUSCULAR; INTRAVENOUS EVERY 5 MIN PRN
Status: DISCONTINUED | OUTPATIENT
Start: 2021-10-13 | End: 2021-10-13 | Stop reason: HOSPADM

## 2021-10-13 RX ORDER — LABETALOL HYDROCHLORIDE 5 MG/ML
5 INJECTION, SOLUTION INTRAVENOUS EVERY 10 MIN PRN
Status: DISCONTINUED | OUTPATIENT
Start: 2021-10-13 | End: 2021-10-13 | Stop reason: HOSPADM

## 2021-10-13 RX ORDER — HYDRALAZINE HYDROCHLORIDE 20 MG/ML
5 INJECTION INTRAMUSCULAR; INTRAVENOUS EVERY 10 MIN PRN
Status: DISCONTINUED | OUTPATIENT
Start: 2021-10-13 | End: 2021-10-13 | Stop reason: HOSPADM

## 2021-10-13 RX ORDER — SUCCINYLCHOLINE CHLORIDE 20 MG/ML
INJECTION INTRAMUSCULAR; INTRAVENOUS PRN
Status: DISCONTINUED | OUTPATIENT
Start: 2021-10-13 | End: 2021-10-13 | Stop reason: SDUPTHER

## 2021-10-13 RX ORDER — METOCLOPRAMIDE HYDROCHLORIDE 5 MG/ML
10 INJECTION INTRAMUSCULAR; INTRAVENOUS
Status: DISCONTINUED | OUTPATIENT
Start: 2021-10-13 | End: 2021-10-13 | Stop reason: HOSPADM

## 2021-10-13 RX ORDER — HYDROMORPHONE HYDROCHLORIDE 1 MG/ML
0.5 INJECTION, SOLUTION INTRAMUSCULAR; INTRAVENOUS; SUBCUTANEOUS EVERY 5 MIN PRN
Status: DISCONTINUED | OUTPATIENT
Start: 2021-10-13 | End: 2021-10-13 | Stop reason: HOSPADM

## 2021-10-13 RX ORDER — ESMOLOL HYDROCHLORIDE 10 MG/ML
INJECTION INTRAVENOUS PRN
Status: DISCONTINUED | OUTPATIENT
Start: 2021-10-13 | End: 2021-10-13 | Stop reason: SDUPTHER

## 2021-10-13 RX ORDER — PROMETHAZINE HYDROCHLORIDE 25 MG/ML
6.25 INJECTION, SOLUTION INTRAMUSCULAR; INTRAVENOUS
Status: DISCONTINUED | OUTPATIENT
Start: 2021-10-13 | End: 2021-10-13 | Stop reason: HOSPADM

## 2021-10-13 RX ORDER — HYDROMORPHONE HYDROCHLORIDE 1 MG/ML
0.25 INJECTION, SOLUTION INTRAMUSCULAR; INTRAVENOUS; SUBCUTANEOUS EVERY 5 MIN PRN
Status: DISCONTINUED | OUTPATIENT
Start: 2021-10-13 | End: 2021-10-13 | Stop reason: HOSPADM

## 2021-10-13 RX ORDER — DIPHENHYDRAMINE HYDROCHLORIDE 50 MG/ML
12.5 INJECTION INTRAMUSCULAR; INTRAVENOUS
Status: DISCONTINUED | OUTPATIENT
Start: 2021-10-13 | End: 2021-10-13 | Stop reason: HOSPADM

## 2021-10-13 RX ORDER — SODIUM CHLORIDE, SODIUM LACTATE, POTASSIUM CHLORIDE, CALCIUM CHLORIDE 600; 310; 30; 20 MG/100ML; MG/100ML; MG/100ML; MG/100ML
INJECTION, SOLUTION INTRAVENOUS CONTINUOUS
Status: DISCONTINUED | OUTPATIENT
Start: 2021-10-13 | End: 2021-10-13 | Stop reason: HOSPADM

## 2021-10-13 RX ORDER — MEPERIDINE HYDROCHLORIDE 25 MG/ML
12.5 INJECTION INTRAMUSCULAR; INTRAVENOUS; SUBCUTANEOUS EVERY 5 MIN PRN
Status: DISCONTINUED | OUTPATIENT
Start: 2021-10-13 | End: 2021-10-13 | Stop reason: HOSPADM

## 2021-10-13 RX ORDER — MORPHINE SULFATE 4 MG/ML
4 INJECTION, SOLUTION INTRAMUSCULAR; INTRAVENOUS EVERY 5 MIN PRN
Status: DISCONTINUED | OUTPATIENT
Start: 2021-10-13 | End: 2021-10-13 | Stop reason: HOSPADM

## 2021-10-13 RX ORDER — LIDOCAINE HYDROCHLORIDE 10 MG/ML
INJECTION, SOLUTION INFILTRATION; PERINEURAL PRN
Status: DISCONTINUED | OUTPATIENT
Start: 2021-10-13 | End: 2021-10-13 | Stop reason: SDUPTHER

## 2021-10-13 RX ORDER — LABETALOL HYDROCHLORIDE 5 MG/ML
INJECTION, SOLUTION INTRAVENOUS PRN
Status: DISCONTINUED | OUTPATIENT
Start: 2021-10-13 | End: 2021-10-13 | Stop reason: SDUPTHER

## 2021-10-13 RX ORDER — SODIUM CHLORIDE, SODIUM LACTATE, POTASSIUM CHLORIDE, CALCIUM CHLORIDE 600; 310; 30; 20 MG/100ML; MG/100ML; MG/100ML; MG/100ML
INJECTION, SOLUTION INTRAVENOUS CONTINUOUS PRN
Status: DISCONTINUED | OUTPATIENT
Start: 2021-10-13 | End: 2021-10-13 | Stop reason: SDUPTHER

## 2021-10-13 RX ADMIN — LIDOCAINE HYDROCHLORIDE 40 MG: 10 INJECTION, SOLUTION INFILTRATION; PERINEURAL at 10:27

## 2021-10-13 RX ADMIN — GLUCAGON HYDROCHLORIDE 1 MG: 1 INJECTION, POWDER, FOR SOLUTION INTRAMUSCULAR; INTRAVENOUS; SUBCUTANEOUS at 10:46

## 2021-10-13 RX ADMIN — Medication 5 MG: at 10:50

## 2021-10-13 RX ADMIN — SODIUM CHLORIDE, POTASSIUM CHLORIDE, SODIUM LACTATE AND CALCIUM CHLORIDE: 600; 310; 30; 20 INJECTION, SOLUTION INTRAVENOUS at 09:28

## 2021-10-13 RX ADMIN — SUGAMMADEX 179 MG: 100 INJECTION, SOLUTION INTRAVENOUS at 11:04

## 2021-10-13 RX ADMIN — PROPOFOL 180 MG: 10 INJECTION, EMULSION INTRAVENOUS at 10:27

## 2021-10-13 RX ADMIN — SUCCINYLCHOLINE CHLORIDE 100 MG: 20 INJECTION, SOLUTION INTRAMUSCULAR; INTRAVENOUS at 10:28

## 2021-10-13 RX ADMIN — ONDANSETRON HYDROCHLORIDE 4 MG: 2 INJECTION, SOLUTION INTRAMUSCULAR; INTRAVENOUS at 10:29

## 2021-10-13 RX ADMIN — ESMOLOL HYDROCHLORIDE 10 MG: 10 INJECTION, SOLUTION INTRAVENOUS at 10:42

## 2021-10-13 RX ADMIN — ROCURONIUM BROMIDE 5 MG: 10 INJECTION, SOLUTION INTRAVENOUS at 10:28

## 2021-10-13 RX ADMIN — ROCURONIUM BROMIDE 15 MG: 10 INJECTION, SOLUTION INTRAVENOUS at 10:37

## 2021-10-13 RX ADMIN — SODIUM CHLORIDE, SODIUM LACTATE, POTASSIUM CHLORIDE, AND CALCIUM CHLORIDE: 600; 310; 30; 20 INJECTION, SOLUTION INTRAVENOUS at 10:19

## 2021-10-13 RX ADMIN — Medication 5 MG: at 10:52

## 2021-10-13 ASSESSMENT — LIFESTYLE VARIABLES: SMOKING_STATUS: 0

## 2021-10-13 ASSESSMENT — ENCOUNTER SYMPTOMS: SHORTNESS OF BREATH: 0

## 2021-10-13 ASSESSMENT — PAIN SCALES - GENERAL
PAINLEVEL_OUTOF10: 0
PAINLEVEL_OUTOF10: 0

## 2021-10-13 ASSESSMENT — PAIN - FUNCTIONAL ASSESSMENT: PAIN_FUNCTIONAL_ASSESSMENT: 0-10

## 2021-10-13 NOTE — H&P
Patient Name: Chelo Pompa  : 1945  MRN: [id number]  DATE: 10/13/21    Allergies: No Known Allergies     ENDOSCOPY  History and Physical    Procedure:    [] Diagnostic Colonoscopy       [] Screening Colonoscopy  [] EGD      [x] ERCP      [] EUS       [] Other    [x] Previous office notes/History and Physical reviewed from the patients chart. Please see EMR for further details of HPI. I have examined the patient's status immediately prior to the procedure and:      Indications/HPI:    CBD stones, h/o ERCP and stenting in the past    Anesthesia:   [x] MAC [] Moderate Sedation   [x] General   [] None     ROS: 12 pt Review of Symptoms was negative unless mentioned above    Medications:   Prior to Admission medications    Medication Sig Start Date End Date Taking?  Authorizing Provider   apixaban (ELIQUIS) 5 MG TABS tablet Take by mouth 2 times daily   Yes Historical Provider, MD   ondansetron (ZOFRAN ODT) 4 MG disintegrating tablet Take 1 tablet by mouth every 8 hours as needed for Nausea or Vomiting 10/1/21   Jean Carlos Ling MD   [place]-3 1000 MG CAPS Take 1 capsule by mouth daily    Historical Provider, MD   metoprolol tartrate (LOPRESSOR) 25 MG tablet Take 1 tablet by mouth 2 times daily 9/3/21   Tati Vargas MD   melatonin 5 MG TBDP disintegrating tablet Take 1 tablet by mouth nightly  Patient taking differently: Take 5 mg by mouth nightly as needed  9/3/21 10/3/21  Tati Vargas MD   allopurinol (ZYLOPRIM) 100 MG tablet Take 100 mg by mouth daily Patient takes half a tablet    Historical Provider, MD   losartan (COZAAR) 50 MG tablet Take 50 mg by mouth daily    Historical Provider, MD   dilTIAZem (CARDIZEM CD) 240 MG extended release capsule Take 240 mg by mouth daily    Historical Provider, MD   omeprazole (PRILOSEC) 20 MG delayed release capsule Take 40 mg by mouth daily    Historical Provider, MD   colchicine (COLCRYS) 0.6 MG tablet Take 0.6 mg by mouth daily as needed for Pain Historical Provider, MD   Multiple Vitamins-Minerals (THERAPEUTIC MULTIVITAMIN-MINERALS) tablet Take 1 tablet by mouth daily    Historical Provider, MD       Past Medical History:  Past Medical History:   Diagnosis Date    Arthritis     [facility]rial [facility]     Chronic kidney disease     COPD (chronic obstructive pulmonary disease) ([facility])     Emphysema of lung ([facility]     GERD (gastroesophageal reflux disease)     Gout     Hypertension     Pneumonia        Past Surgical History:  Past Surgical History:   Procedure Laterality Date    APPENDECTOMY      CHOLECYSTECTOMY, LAPAROSCOPIC N/A 10/1/2021    LAPAROSCOPIC ROBOT ASSISTED CHOLECYSTECTOMY WITH FIREFLY ICG performed by Kelly Srivastava MD at [address] [place] [address] ERCP N/A 09/01/2021    Dr CARLO Bowling-w/placement of a 10F x 7 cm biliary stent and a 7F x 7 cm pancreatic stent, stone removal-Biliary obstruction due to stone/sludge, 3 month repeat    TONSILLECTOMY         Social History:  Social History     Tobacco Use    Smoking status: Former Smoker     Types: Cigarettes    Smokeless tobacco: Never Used    Tobacco comment: quit smoking 30 yrs ago   Vaping Use    Vaping Use: Never used   Substance Use Topics    Alcohol use: Not Currently    Drug use: Not Currently       Vital Signs:   Vitals:    10/13/21 0908   BP: (!) 141/79   Pulse: 101   Resp: 18   Temp: 97.9 °F (36.6 °C)   SpO2: 98%        Physical Exam:  Cardiac:  [x]WNL  []Comments:  Pulmonary:  [x]WNL   []Comments:  Neuro/Mental Status:  [x]WNL  []Comments:  Abdominal:  [x]WNL    []Comments:  Other:   []WNL  []Comments:    Informed Consent:  The risks and benefits of the procedure have been discussed with either the patient or if they cannot consent, their representative. Assessment:  Patient examined and appropriate for planned sedation and procedure. Plan:  Proceed with planned sedation and procedure as above.          Joana Drew MD

## 2021-10-13 NOTE — OP NOTE
Endoscopic Procedure Note    Patient: Karen Greenfield : 1945  Med Rec#: [id number] Acc#: [id number]     Primary Care Provider Rodríguez Bragg  Referring Provider: Miguel Del Cid MD    Endoscopist: Cristela Thomason MD    Date of Procedure:  10/13/2021     Procedure:   1. ERCP with stent removal x 2   2. ERCP with stone removal  3. Intra procedure interpretation of biliary cholangiogram     Indications:   1. Known h/o choledocholithiasis s/p stone removal and stent insertion (MPD and CBD stented)  2. S/p cholecystectomy    Anesthesia:  General     Estimated Blood Loss: minimal    Procedure:   Prior to the procedure the patient's chart was reviewed and informed consent was obtained. Risk and Benefits (Risks including but not limited to bleeding, perforation, infection, 8 to 10% risk of post ERCP pancreatitis, and even death) were discussed with the patient. They were agreeable to continue. Patient was brought to the operating room, underwent general anesthesia, and placed in the prone position. A side-viewing duodenoscope was advanced from the oropharynx down to the distal duodenum and reduced into a short position opposite the ampulla. The ampulla appeared abnormal with intimate involvement of the edge of a duodenal diverticulum. 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 A  film was obtained which appeared showed the biliary stent in proper position. The pancreatic stent appeared to be more distal to the ampulla. Stent removal:  An existing stent was seen emanating from the ampulla (biliary stent). This was grasped and removed with a polypectomy snare and removed in its entirety through the scope. The pancreatic stent was seen more distal in the duodenum (out of pancreatic orifice but detained in diverticulum). This was grasped and removed with polypectomy snare as well. The bile duct was then cannulated using a 0.035 x 260 cm straight Dreamwire.  A short nose traction sphincterotome was advanced over the wire and the bile duct was deeply cannulated. Contrast was injected. I personally interpreted the bile duct images. The flow of contrast was adequate. Contrast injection showed filling defects in the distal common bile duct. The pancreatic duct was not cannulated nor injected. The bile duct was swept multiple times starting the bifurcation with a 12mm biliary extraction balloon. Multiple large pieces of stone debris and thick sludge was removed. Occlusion cholangiogram showed no further filling defects. At the end of the procedure the biliary tree was draining well. IMPRESSION:  1. Removal of previously placed biliary and pancreatic stents   2. Balloon sweep and removal of choledocholithiasis and sludge     RECOMMENDATIONS:    1. Clear liquid diet today with advancing diet tomorrow as tolerated. 2.  Patient to call with any questions/concerns or recurrent symptoms. The results were discussed with the patient and family. A copy of the images obtained were given to the patient.      Mallory Eid MD  10/13/2021  11:37 AM

## 2021-10-13 NOTE — ANESTHESIA POSTPROCEDURE EVALUATION
Department of Anesthesiology  Postprocedure Note    Patient: Harley Bryant  MRN: [id number]  YOB: 1945  Date of evaluation: 10/13/2021  Time:  11:23 AM     Procedure Summary     Date: 10/13/21 Room / Location: [address]    Anesthesia Start: 7763 Anesthesia Stop:     Procedure: ERCP STENT REMOVAL (N/A ) Diagnosis: (STENT REMOVAL)    Surgeons: Gil Huitron MD Responsible Provider: ELYSE De Oliveira CRNA    Anesthesia Type: general ASA Status: 3          Anesthesia Type: No value filed. Cali Phase I: Cali Score: 9    Cali Phase II:      Last vitals: Reviewed and per EMR flowsheets.        Anesthesia Post Evaluation    Patient location during evaluation: PACU  Patient participation: complete - patient participated  Level of consciousness: sleepy but conscious  Pain score: 2  Airway patency: patent  Nausea & Vomiting: no nausea and no vomiting  Complications: no  Cardiovascular status: hemodynamically stable and blood pressure returned to baseline  Respiratory status: acceptable and nasal cannula  Hydration status: stable

## 2021-10-13 NOTE — ANESTHESIA PRE PROCEDURE
Department of Anesthesiology  Preprocedure Note       Name:  Linda Wiley   Age:  67 y.o.  :  1945                                          MRN:  [id number]         Date:  10/13/2021      Surgeon: Ro Query):  Jd Yu MD    Procedure: Procedure(s):  ERCP ENDOSCOPIC RETROGRADE CHOLANGIOPANCREATOGRAPHY    Medications prior to admission:   Prior to Admission medications    Medication Sig Start Date End Date Taking? Authorizing Provider   ondansetron (ZOFRAN ODT) 4 MG disintegrating tablet Take 1 tablet by mouth every 8 hours as needed for Nausea or Vomiting 10/1/21   Ian Ibrahim MD   [place]-3 1000 MG CAPS Take 1 capsule by mouth daily    Historical Provider, MD   metoprolol tartrate (LOPRESSOR) 25 MG tablet Take 1 tablet by mouth 2 times daily 9/3/21   Denisha Smallwood MD   melatonin 5 MG TBDP disintegrating tablet Take 1 tablet by mouth nightly  Patient taking differently: Take 5 mg by mouth nightly as needed  9/3/21 10/3/21  Denisha Smallwood MD   allopurinol (ZYLOPRIM) 100 MG tablet Take 100 mg by mouth daily Patient takes half a tablet    Historical Provider, MD   losartan (COZAAR) 50 MG tablet Take 50 mg by mouth daily    Historical Provider, MD   dilTIAZem (CARDIZEM CD) 240 MG extended release capsule Take 240 mg by mouth daily    Historical Provider, MD   omeprazole (PRILOSEC) 20 MG delayed release capsule Take 40 mg by mouth daily    Historical Provider, MD   colchicine (COLCRYS) 0.6 MG tablet Take 0.6 mg by mouth daily as needed for Pain    Historical Provider, MD   Multiple Vitamins-Minerals (THERAPEUTIC MULTIVITAMIN-MINERALS) tablet Take 1 tablet by mouth daily    Historical Provider, MD       Current medications:    No current facility-administered medications for this visit. No current outpatient medications on file.      Facility-Administered Medications Ordered in Other Visits   Medication Dose Route Frequency Provider Last Rate Last Admin    indomethacin (INDOCIN) 50 MG suppository 100 mg  100 mg Rectal On Call to [address], MD        lactated ringers infusion   IntraVENous Continuous Chanell Pepper MD        meperidine (DEMEROL) injection 12.5 mg  12.5 mg IntraVENous Q5 Min PRN [facility], APRN - CRNA        HYDROmorphone HCl PF (DILAUDID) injection 0.25 mg  0.25 mg IntraVENous Q5 Min PRN [facility], APRN - CRNA        HYDROmorphone HCl PF (DILAUDID) injection 0.5 mg  0.5 mg IntraVENous Q5 Min PRN [facility], APRN - CRNA        morphine (PF) injection 2 mg  2 mg IntraVENous Q5 Min PRN [facility], APRN - CRNA        morphine injection 4 mg  4 mg IntraVENous Q5 Min PRN [facility], APRN - CRNA        promethazine (PHENERGAN) injection 6.25 mg  6.25 mg IntraVENous Once PRN [facility], APRN - CRNA        metoclopramide (REGLAN) injection 10 mg  10 mg IntraVENous Once PRN [facility], APRN - CRNA        diphenhydrAMINE (BENADRYL) injection 12.5 mg  12.5 mg IntraVENous Once PRN [facility], APRN - CRNA        labetalol (NORMODYNE;TRANDATE) injection 5 mg  5 mg IntraVENous Q10 Min PRN [facility], APRN - CRNA        hydrALAZINE (APRESOLINE) injection 5 mg  5 mg IntraVENous Q10 Min PRN [facility], APRN - CRNA        enalaprilat (VASOTEC) injection 1.25 mg  1.25 mg IntraVENous Once PRN [facility], APRN - CRNA           Allergies:  No Known Allergies    Problem List:    Patient Active Problem List   Diagnosis Code    Common bile duct (CBD) obstruction K83.1    Cholangitis K83.09       Past Medical History:        Diagnosis Date    Arthritis     Chronic kidney disease     [facility] [facility]     Emphysema of lung [facility] [facility]     GERD (gastroesophageal reflux disease)     Hypertension     Pneumonia        Past Surgical History:        Procedure Laterality Date    APPENDECTOMY      CHOLECYSTECTOMY, LAPAROSCOPIC N/A 10/1/2021    LAPAROSCOPIC ROBOT ASSISTED CHOLECYSTECTOMY WITH FIREFLY ICG performed by Marce Jeff MD at [facility] ERCP N/A 09/01/2021    Dr CARLO Bowling-w/placement of a 10F x 7 cm biliary stent and a 7F x 7 cm pancreatic stent, stone removal-Biliary obstruction due to stone/sludge, 3 month repeat    TONSILLECTOMY         Social History:    Social History     Tobacco Use    Smoking status: Former Smoker     Types: Cigarettes    Smokeless tobacco: Never Used    Tobacco comment: quit smoking 30 yrs ago   Substance Use Topics    Alcohol use: Not Currently                                Counseling given: Not Answered  Comment: quit smoking 30 yrs ago      Vital Signs (Current): There were no vitals filed for this visit. BP Readings from Last 3 Encounters:   10/01/21 127/75   10/01/21 122/67   09/10/21 102/66       NPO Status:                                                                                 BMI:   Wt Readings from Last 3 Encounters:   09/29/21 197 lb (89.4 kg)   10/01/21 197 lb (89.4 kg)   09/16/21 200 lb 3.2 oz (90.8 kg)     There is no height or weight on file to calculate BMI.    CBC:   Lab Results   Component Value Date    WBC 16.8 09/02/2021    RBC 4.71 09/02/2021    HGB 13.1 09/02/2021    HCT 40.8 09/02/2021    MCV 86.6 09/02/2021    RDW 15.4 09/02/2021     09/02/2021       CMP:   Lab Results   Component Value Date     09/02/2021    K 4.2 09/02/2021     09/02/2021    CO2 22 09/02/2021    BUN 21 09/02/2021    CREATININE 1.3 09/02/2021    GFRAA >59 09/02/2021    LABGLOM 54 09/02/2021    GLUCOSE 170 09/02/2021    PROT 5.6 09/02/2021    CALCIUM 8.8 09/02/2021    BILITOT 4.4 09/02/2021    ALKPHOS 116 09/02/2021     09/02/2021     09/02/2021       POC Tests: No results for input(s): POCGLU, POCNA, POCK, POCCL, POCBUN, POCHEMO, POCHCT in the last 72 hours. Coags:   Lab Results   Component Value Date    PROTIME 16.1 09/01/2021    INR 1.28 09/01/2021       HCG (If Applicable):  No results found for: PREGTESTUR, PREGSERUM, HCG, HCGQUANT     ABGs: No results found for: PHART, PO2ART, LYQ5QWE, CYW5UGC, BEART, U7QWCNXW     Type & Screen (If Applicable):  No results found for: LABABO, LABRH    Drug/Infectious Status (If Applicable):  No results found for: HIV, HEPCAB    COVID-19 Screening (If Applicable):   Lab Results   Component Value Date    COVID19 Not Detected 10/11/2021           Anesthesia Evaluation  Patient summary reviewed no history of anesthetic complications:   Airway: Mallampati: II  TM distance: >3 FB   Neck ROM: full  Mouth opening: > = 3 FB Dental: normal exam   (+) caps      Pulmonary:normal exam  breath sounds clear to auscultation  (+) COPD:      (-) shortness of breath and not a current smoker          Patient did not smoke on day of surgery. ROS comment: Former smoker   Cardiovascular:    (+) hypertension:, valvular problems/murmurs: AS, murmur,     (-) CAD,  angina and  CHF    ECG reviewed  Rhythm: regular  Rate: normal           Beta Blocker:  Dose within 24 Hrs      ROS comment: Date of Birth 1945     Room Number            [address] [place] [address]      Age           67 year(s)      Height:       70 inches      Referring Physician    Guanako Woodard      Weight:       198 pounds     Sonographer            Swathi Qiu RDCS      BSA:          2.08 m^2       Interpreting Physician Augie Aragon DO      BMI:          28.41 kg/m^2     Procedure     Type of Study      TTE procedure:ECHO 2D W/DOPPLER/COLOR/CONTRAST. Study Location: Echo Lab  Technical Quality: Adequate visualization     Patient Status: Inpatient     Contrast Medium: Definity. Amount - 4 ml     Rhythm: Atrial fibrillation BP: 140/87 mmHg     Indications:Atrial fibrillation.      Conclusions      Summary   Rhythm is noted to be atrial fibrillation. Normal left ventricular chamber size and systolic function. Moderate concentric left ventricular hypertrophy.    Left ventricular ejection fraction is

## 2021-10-14 ENCOUNTER — OFFICE VISIT (OUTPATIENT)
Dept: SURGERY | Age: 76
End: 2021-10-14

## 2021-10-14 VITALS
BODY MASS INDEX: 29.2 KG/M2 | WEIGHT: 204 LBS | HEIGHT: 70 IN | TEMPERATURE: 97.2 F | HEART RATE: 89 BPM | OXYGEN SATURATION: 97 %

## 2021-10-14 DIAGNOSIS — Z09 POSTOPERATIVE EXAMINATION: Primary | ICD-10-CM

## 2021-10-14 PROCEDURE — 99024 POSTOP FOLLOW-UP VISIT: CPT | Performed by: SURGERY

## 2021-11-08 ENCOUNTER — TELEPHONE (OUTPATIENT)
Dept: SURGERY | Age: 76
End: 2021-11-08

## 2021-11-08 NOTE — TELEPHONE ENCOUNTER
I s/w patient, he doesn't know how to send pic & said he doesn't have mychart either. He wanted to have Dr Josey Sage look at his redness at his belly button area later this week. I made him appt for Thursday afternoon.

## 2021-11-08 NOTE — TELEPHONE ENCOUNTER
Surgery with Dr Sae Leyva Oct 1st - still have redness around belly button    Recent PCP labs showed elevated white blood cell count    No redness, fever or pain - concerned with results and is calling to inquire.     PCP can fax to [facility]    Please call patient [phone]

## 2021-11-11 ENCOUNTER — OFFICE VISIT (OUTPATIENT)
Dept: CARDIOLOGY CLINIC | Age: 76
End: 2021-11-11
Payer: MEDICARE

## 2021-11-11 ENCOUNTER — OFFICE VISIT (OUTPATIENT)
Dept: SURGERY | Age: 76
End: 2021-11-11

## 2021-11-11 VITALS
BODY MASS INDEX: 28.2 KG/M2 | WEIGHT: 197 LBS | HEIGHT: 70 IN | DIASTOLIC BLOOD PRESSURE: 76 MMHG | HEART RATE: 92 BPM | SYSTOLIC BLOOD PRESSURE: 134 MMHG

## 2021-11-11 VITALS
HEIGHT: 70 IN | BODY MASS INDEX: 29.69 KG/M2 | WEIGHT: 207.4 LBS | HEART RATE: 72 BPM | OXYGEN SATURATION: 99 % | TEMPERATURE: 97.1 F

## 2021-11-11 DIAGNOSIS — Z90.49 S/P LAPAROSCOPIC CHOLECYSTECTOMY: Primary | ICD-10-CM

## 2021-11-11 DIAGNOSIS — I35.0 MILD AORTIC VALVE STENOSIS: ICD-10-CM

## 2021-11-11 DIAGNOSIS — I10 ESSENTIAL HYPERTENSION: ICD-10-CM

## 2021-11-11 DIAGNOSIS — I48.21 PERMANENT ATRIAL FIBRILLATION (HCC): Primary | ICD-10-CM

## 2021-11-11 PROCEDURE — 99214 OFFICE O/P EST MOD 30 MIN: CPT | Performed by: INTERNAL MEDICINE

## 2021-11-11 PROCEDURE — 99024 POSTOP FOLLOW-UP VISIT: CPT | Performed by: SURGERY

## 2021-11-11 RX ORDER — MULTIVIT WITH MINERALS/LUTEIN
250 TABLET ORAL DAILY
COMMUNITY

## 2021-11-11 ASSESSMENT — ENCOUNTER SYMPTOMS
BLOOD IN STOOL: 0
COUGH: 0
SHORTNESS OF BREATH: 0
ANAL BLEEDING: 0

## 2021-11-11 NOTE — PROGRESS NOTES
Office Visit  Yoel Ragland is a 67 y.o. male; who present today for Establish Cardiologist (No cardiac symptoms) and Follow-up      HPI  I am seeing this 48-year-old in follow-up but is the first time I am seeing him personally. He has permanent atrial fibrillation that has been present since about 2004, first diagnosed in \[facility] [place] [facility]\"" when he states that he had extensive cardiac evaluation. It does not sound like there was ever a recommendation to convert him back in the rhythm. He has not been known to have been in rhythm since that time but he tolerates it well. He is active and experiences no palpitations at all. Denies any chest discomfort or dyspnea, no presyncope or syncope. He checks his blood pressure on a daily basis and it has been normal.  He has never had stroke or TIA, no episode of loss of vision, motor weakness, slurred speech or other neurologic symptoms since his last visit. He was placed on Eliquis about 6 weeks ago and tolerates it well, no GI bleed or melena.   Current Outpatient Medications   Medication Sig Dispense Refill    Ascorbic Acid (VITAMIN C) 250 MG tablet Take 250 mg by mouth daily      apixaban (ELIQUIS) 5 MG TABS tablet Take by mouth 2 times daily      ondansetron (ZOFRAN ODT) 4 MG disintegrating tablet Take 1 tablet by mouth every 8 hours as needed for Nausea or Vomiting 10 tablet 2    [place]-3 1000 MG CAPS Take 1 capsule by mouth daily      metoprolol tartrate (LOPRESSOR) 25 MG tablet Take 1 tablet by mouth 2 times daily 60 tablet 3    allopurinol (ZYLOPRIM) 100 MG tablet Take 100 mg by mouth daily Patient takes half a tablet      losartan (COZAAR) 50 MG tablet Take 50 mg by mouth daily      dilTIAZem (CARDIZEM CD) 240 MG extended release capsule Take 240 mg by mouth daily      omeprazole (PRILOSEC) 20 MG delayed release capsule Take 40 mg by mouth daily      colchicine (COLCRYS) 0.6 MG tablet Take 0.6 mg by mouth daily as needed for Pain      Multiple Vitamins-Minerals (THERAPEUTIC MULTIVITAMIN-MINERALS) tablet Take 1 tablet by mouth daily      melatonin 5 MG TBDP disintegrating tablet Take 1 tablet by mouth nightly (Patient taking differently: Take 5 mg by mouth nightly as needed ) 30 tablet 0     No current facility-administered medications for this visit. There are no discontinued medications. No Known Allergies    Past Medical History:   Diagnosis Date    Arthritis     [facility] fibrillation [facility] [address]     Chronic kidney disease     COPD (chronic obstructive pulmonary disease) (HCC)     Emphysema of lung ([facility] [address]     GERD (gastroesophageal reflux disease)     Gout     Hypertension     Pneumonia      Negative - Past Medical History for  No past medical history pertinent negatives. Past Surgical History:   Procedure Laterality Date    APPENDECTOMY      CHOLECYSTECTOMY, LAPAROSCOPIC N/A 10/01/2021    LAPAROSCOPIC ROBOT ASSISTED CHOLECYSTECTOMY WITH FIREFLY ICG performed by Yin Gonzalez MD at [address] [place] [address] ERCP N/A 09/01/2021    Dr CARLO Dean/placement of a 10F x 7 cm biliary stent and a 7F x 7 cm pancreatic stent, stone removal-Biliary obstruction due to stone/sludge, 3 month repeat    ERCP N/A 10/13/2021    Dr Preet Dean/stone and biliary and pancreatic stent removal-Choledocholithiasis and sludge    TONSILLECTOMY       Social History     Occupational History    Not on file   Tobacco Use    Smoking status: Former Smoker     Types: Cigarettes    Smokeless tobacco: Never Used    Tobacco comment: quit smoking 30 yrs ago   Vaping Use    Vaping Use: Never used   Substance and Sexual Activity    Alcohol use: Not Currently    Drug use: Not Currently    Sexual activity: Not Currently        Family History   Problem Relation Age of Onset    Cancer Mother     Cancer Father     No Known Problems Sister        Review of Systems  Review of Systems   Constitutional: Negative for fatigue and fever.    Respiratory: Negative for cough and shortness of breath. Cardiovascular: Positive for leg swelling. Negative for chest pain and palpitations. Gastrointestinal: Negative for anal bleeding and blood in stool. Neurological: Negative for syncope, facial asymmetry and speech difficulty. Physical Exam  /76   Pulse 92   Ht 5' 10\" (1.778 m)   Wt 197 lb (89.4 kg)   BMI 28.27 kg/m²    Physical Exam  Constitutional:       Appearance: Normal appearance. He is normal weight. Cardiovascular:      Rate and Rhythm: Normal rate. Rhythm irregularly irregular. Heart sounds: S1 normal and S2 normal. Murmur heard. Systolic (Harsh and early, second right intercostal space) murmur is present with a grade of 2/6. No diastolic murmur is present. No gallop. Pulmonary:      Effort: Pulmonary effort is normal.      Breath sounds: Normal breath sounds. Abdominal:      General: Abdomen is flat. Bowel sounds are normal.      Palpations: Abdomen is soft. Musculoskeletal:      Right lower le+ Pitting Edema present. Left lower le+ Pitting Edema present. Skin:     General: Skin is warm and dry. Neurological:      Mental Status: He is alert. Assessment/Plan    EKG Findings:  Not performed today    Problem List Items Addressed This Visit        Cardiology Problems    Permanent atrial fibrillation (HCC) - Primary    Mild aortic valve stenosis    Essential hypertension           Diagnosis Orders   1. Permanent atrial fibrillation (HCC)      Rate controlled on metoprolol, diltiazem and Eliquis   2. Mild aortic valve stenosis      Noted by echo, 2021   3. Essential hypertension         Recommendations:   Diet: Low-sodium diet  Activity: Normal activities  Medication Changes: Continue same medications    This patient tolerates atrial fibrillation well. The goals of therapy are heart rate control and stroke prevention and these have been achieved as best as possible. If he has any change she is to let us know.

## 2021-12-03 NOTE — PROGRESS NOTES
Subjective:  Mr. Ronny Garcia is here today because he has some concerns about some redness at his umbilical incision site. He also had some elevated WBC on labs done by his PCP, so he is concerned that there could be infection at the umbilical site causing elevated WBC. He denies any drainage from the incision. Objective:  Vitals reviewed  General: NAD, AAO  Abdomen: Soft, NT, ND, incisions healing well. Mild pink color at skin edges, which are appropriately approximated, pink color normal healing    Assessment and plan:  67year old male s/p laparoscopic cholecystectomy  Healing well from cholecystectomy, no abnormality in appearance of umbilical incision. Encouraged him to follow up with his PCP regarding his lab work. Follow up in general surgery as needed and call for any questions or concerns.

## 2022-03-01 RX ORDER — APIXABAN 5 MG/1
TABLET, FILM COATED ORAL
Qty: 60 TABLET | Refills: 3 | Status: SHIPPED | OUTPATIENT
Start: 2022-03-01 | End: 2022-08-01

## 2022-05-12 ENCOUNTER — OFFICE VISIT (OUTPATIENT)
Dept: CARDIOLOGY CLINIC | Age: 77
End: 2022-05-12
Payer: MEDICARE

## 2022-05-12 VITALS
WEIGHT: 206 LBS | HEART RATE: 84 BPM | DIASTOLIC BLOOD PRESSURE: 60 MMHG | BODY MASS INDEX: 29.49 KG/M2 | HEIGHT: 70 IN | SYSTOLIC BLOOD PRESSURE: 102 MMHG

## 2022-05-12 DIAGNOSIS — I48.21 PERMANENT ATRIAL FIBRILLATION (HCC): Primary | ICD-10-CM

## 2022-05-12 DIAGNOSIS — I35.0 MILD AORTIC VALVE STENOSIS: ICD-10-CM

## 2022-05-12 DIAGNOSIS — I10 ESSENTIAL HYPERTENSION: ICD-10-CM

## 2022-05-12 PROCEDURE — G8417 CALC BMI ABV UP PARAM F/U: HCPCS | Performed by: CLINICAL NURSE SPECIALIST

## 2022-05-12 PROCEDURE — 99214 OFFICE O/P EST MOD 30 MIN: CPT | Performed by: CLINICAL NURSE SPECIALIST

## 2022-05-12 PROCEDURE — 1036F TOBACCO NON-USER: CPT | Performed by: CLINICAL NURSE SPECIALIST

## 2022-05-12 PROCEDURE — 1123F ACP DISCUSS/DSCN MKR DOCD: CPT | Performed by: CLINICAL NURSE SPECIALIST

## 2022-05-12 PROCEDURE — G8427 DOCREV CUR MEDS BY ELIG CLIN: HCPCS | Performed by: CLINICAL NURSE SPECIALIST

## 2022-05-12 PROCEDURE — 4040F PNEUMOC VAC/ADMIN/RCVD: CPT | Performed by: CLINICAL NURSE SPECIALIST

## 2022-05-12 PROCEDURE — 93000 ELECTROCARDIOGRAM COMPLETE: CPT | Performed by: CLINICAL NURSE SPECIALIST

## 2022-05-12 ASSESSMENT — ENCOUNTER SYMPTOMS
WHEEZING: 0
NAUSEA: 0
VOMITING: 0
ABDOMINAL PAIN: 0
SHORTNESS OF BREATH: 0
FACIAL SWELLING: 0
CHEST TIGHTNESS: 0
EYE REDNESS: 0
COUGH: 0

## 2022-05-12 NOTE — PROGRESS NOTES
[facility], [address], [address]  [address]  Phone: [phone]  Fax: [phone]    OFFICE VISIT:  2022    Liya Perla - : 1945    Reason For Visit:  Paulo Quick is a 67 y.o. male who is here for 6 Month Follow-Up and Atrial Fibrillation       Diagnosis Orders   1. Permanent atrial fibrillation (HCC)  EKG 12 lead   2. Mild aortic valve stenosis     3. Essential hypertension           HPI  Patient follows with our office with a longstanding history of atrial fibrillation, aortic stenosis, hypertension, former history of smoking. Patient initially presented to our office last year, he was not on anticoagulation and Eliquis was started. He denies palpitations or fast heart rates. He denies any unusual dyspnea, chest pain, orthopnea, PND, edema. 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   Tj Zamudio has the following history as recorded in [facility]:    Patient Active Problem List    Diagnosis Date Noted    Permanent atrial fibrillation [facility] 2021    Mild aortic valve stenosis 2021    Essential hypertension 2021    Cholangitis     Common bile duct (CBD) obstruction 2021     Past Medical History:   Diagnosis Date    Arthritis     Atrial fibrillation (HCC)     Chronic kidney disease     COPD (chronic obstructive pulmonary disease) ([address]     Emphysema of lung ([facility] [address]     GERD (gastroesophageal reflux disease)     Gout     Hypertension     Pneumonia      Past Surgical History:   Procedure Laterality Date    APPENDECTOMY      CHOLECYSTECTOMY, LAPAROSCOPIC N/A 10/01/2021    LAPAROSCOPIC ROBOT ASSISTED CHOLECYSTECTOMY WITH FIREFLY ICG performed by Ernie Ramsay MD at [address] [place] [address] ERCP N/A 2021    Dr CARLO Bowling-w/placement of a 10F x 7 cm biliary stent and a 7F x 7 cm pancreatic stent, stone removal-Biliary obstruction due to stone/sludge, 3 month repeat    ERCP N/A 10/13/2021    Dr Daniela Bowling-w/stone and biliary and pancreatic stent removal-Choledocholithiasis and sludge    TONSILLECTOMY       Family History   Problem Relation Age of Onset    Cancer Mother     Cancer Father     No Known Problems Sister      Social History     Tobacco Use    Smoking status: Former Smoker     Types: Cigarettes    Smokeless tobacco: Never Used    Tobacco comment: quit smoking 30 yrs ago   Substance Use Topics    Alcohol use: Not Currently      Current Outpatient Medications   Medication Sig Dispense Refill    metoprolol tartrate (LOPRESSOR) 25 MG tablet Take 1 tablet by mouth 2 times daily 180 tablet 3    ELIQUIS 5 MG TABS tablet TAKE 1 TABLET BY MOUTH TWICE DAILY 60 tablet 3    Ascorbic Acid (VITAMIN C) 250 MG tablet Take 250 mg by mouth daily      ondansetron (ZOFRAN ODT) 4 MG disintegrating tablet Take 1 tablet by mouth every 8 hours as needed for Nausea or Vomiting 10 tablet 2    Omega-3 1000 MG CAPS Take 1 capsule by mouth daily      melatonin 5 MG TBDP disintegrating tablet Take 1 tablet by mouth nightly (Patient taking differently: Take 5 mg by mouth nightly as needed ) 30 tablet 0    allopurinol (ZYLOPRIM) 100 MG tablet Take 100 mg by mouth daily Patient takes half a tablet      losartan (COZAAR) 50 MG tablet Take 50 mg by mouth daily      dilTIAZem (CARDIZEM CD) 240 MG extended release capsule Take 240 mg by mouth daily      omeprazole (PRILOSEC) 20 MG delayed release capsule Take 40 mg by mouth daily      colchicine (COLCRYS) 0.6 MG tablet Take 0.6 mg by mouth daily as needed for Pain      Multiple Vitamins-Minerals (THERAPEUTIC MULTIVITAMIN-MINERALS) tablet Take 1 tablet by mouth daily       No current facility-administered medications for this visit. Allergies: Patient has no known allergies.     Review of Systems  Review of Systems   Constitutional: Negative for activity change, diaphoresis, fatigue, fever and unexpected weight change. HENT: Negative for facial swelling and nosebleeds. Eyes: Negative for redness and visual disturbance. Respiratory: Negative for cough, chest tightness, shortness of breath and wheezing. Cardiovascular: Negative for chest pain, palpitations and leg swelling. Gastrointestinal: Negative for abdominal pain, nausea and vomiting. Endocrine: Negative for cold intolerance and heat intolerance. Genitourinary: Negative for dysuria and hematuria. Musculoskeletal: Negative for arthralgias and myalgias. Skin: Negative for pallor and rash. Neurological: Negative for dizziness, seizures, syncope, weakness and light-headedness. Hematological: Does not bruise/bleed easily. Psychiatric/Behavioral: Negative for agitation. The patient is not nervous/anxious. Objective  Vital Signs - /60   Pulse 84   Ht 5' 10\" (1.778 m)   Wt 206 lb (93.4 kg)   BMI 29.56 kg/m²   Physical Exam  Vitals and nursing note reviewed. Constitutional:       General: He is not in acute distress. Appearance: He is well-developed. He is not diaphoretic. HENT:      Head: Normocephalic and atraumatic. Right Ear: Hearing and external ear normal.      Left Ear: Hearing and external ear normal.      Nose: Nose normal.   Eyes:      General:         Right eye: No discharge. Left eye: No discharge. Pupils: Pupils are equal, round, and reactive to light. Neck:      Thyroid: No thyromegaly. Vascular: No carotid bruit or JVD. Trachea: No tracheal deviation. Cardiovascular:      Rate and Rhythm: Normal rate. Rhythm irregular. Heart sounds: Normal heart sounds. No murmur heard. No friction rub. No gallop. Pulmonary:      Effort: Pulmonary effort is normal. No respiratory distress. Breath sounds: Normal breath sounds. No wheezing or rales. Abdominal:      Palpations: Abdomen is soft. Tenderness: There is no abdominal tenderness.    Musculoskeletal: General: No swelling or deformity. Cervical back: Neck supple. No muscular tenderness. Right lower leg: No edema. Left lower leg: No edema. Skin:     General: Skin is warm and dry. Findings: No rash. Neurological:      General: No focal deficit present. Mental Status: He is alert and oriented to person, place, and time. Cranial Nerves: No cranial nerve deficit. Psychiatric:         Mood and Affect: Mood normal.         Behavior: Behavior normal.         Judgment: Judgment normal.         Data:  Lab Results   Component Value Date    WBC 16.8 09/02/2021    RBC 4.71 09/02/2021    HGB 13.1 09/02/2021    HCT 40.8 09/02/2021     09/02/2021      No results found for: CHOL, TRIG, HDL, LDLCALC  Lab Results   Component Value Date     09/02/2021    K 4.2 09/02/2021     09/02/2021    CO2 22 09/02/2021    GLUCOSE 170 09/02/2021    BUN 21 09/02/2021    CREATININE 1.3 09/02/2021    CALCIUM 8.8 09/02/2021     09/02/2021     09/02/2021     Lab Results   Component Value Date    TSH 2.030 09/01/2021       Echo 9/1/21  Summary   Rhythm is noted to be atrial fibrillation. Normal left ventricular chamber size and systolic function. Moderate concentric left ventricular hypertrophy. Left ventricular ejection fraction is visually estimated at 65%. Aortic valve leaflets are mildly thickened. There is mildly decreased   excursion. Mild aortic stenosis is present. The peak gradient across the aortic valve is 36 mmHg. The mean gradient across the aortic valve is 19 mmHg. The left atrium is mildly dilated.       [id number] Score for Atrial Fibrillation Stroke Risk   Risk   Factors  Component Value   C CHF No 0   H HTN Yes 1   A2 Age >= 76 Yes,  (78 y.o.) 2   D DM No 0   S2 Prior Stroke/TIA No 0   V Vascular Disease No 0   A Age 74-69 No,  (78 y.o.) 0   Sc Sex male 0    [id number]  Score  3   Score last updated 9/10/21 1:36 AM CDT    Click here for a link to the UpToDate guideline \"Atrial Fibrillation: Anticoagulation therapy to prevent embolization    EKG shows atrial fibrillation rate 84    Assessment:     Diagnosis Orders   1. Permanent atrial fibrillation (HCC)  EKG 12 lead   2. Mild aortic valve stenosis     3. Essential hypertension         Permanent atrial fibrillation- stable rate control on metoprolol and diltiazem. Remains anticoagulated with Eliquis without bleeding or falling issues. Continue present treatment    Hypertension-stable on current regimen with metoprolol and diltiazem as well as losartan. Aortic stenosis-mild per  Echo 2021. Continue to monitor    Plan    Orders Placed This Encounter   Procedures    EKG 12 lead     Order Specific Question:   Reason for Exam?     Answer:   Irregular heart rate     Return in about 6 months (around 11/12/2022) for APRN. Call with any questionsor concerns  Follow up with Lyndon Gibson for non cardiac problems  Report any new problems  Cardiovascular Fitness-Exercise as tolerated. Strive for 15 minutes of exercise most days of the week. Cardiac / HealthyDiet  Continue current medications as directed  Continue plan of treatment  It is always recommended that you bring your medicationsbottles with you to each visit - this is for your safety!        ELYSE Griffith

## 2022-06-03 ENCOUNTER — TELEPHONE (OUTPATIENT)
Dept: CARDIOLOGY CLINIC | Age: 77
End: 2022-06-03

## 2022-06-03 NOTE — TELEPHONE ENCOUNTER
[facility] with PCP office called stating patient is having surgery and needs cardiac clearance. Advised that the surgeon will need to fax the cardiac clearance request to our office for provider to review. She voiced understanding and will pass information to surgeons office.

## 2022-06-06 ENCOUNTER — TELEPHONE (OUTPATIENT)
Dept: CARDIOLOGY CLINIC | Age: 77
End: 2022-06-06

## 2022-06-06 NOTE — TELEPHONE ENCOUNTER
Okay to send letter for cardiac risk stratification and okay to hold Eliquis 48 hours prior to procedure and resume thereafter

## 2022-06-06 NOTE — TELEPHONE ENCOUNTER
Date: 7-1-22    Cardiologist: Dr. Hector Self    Procedure: Right Hand carpal tunnel release and right elbow ulnar nerve decompression. Surgeon: Dr. Mili Quick    Last Office Visit: 5-12-22    Reason for office visit and medical concerns addressed at this office visit: PAF, HTN, CKD    Testing Performed and Date of Service:  EKG 5-12-22  Echo 9-1-21    RCRI = 0 pts, low, 0.4%   METs 4    Current Medications: lopressor, eliquis, vit C, zofran, cozaar, cardizem, prilosec, colchicine    Is the patient currently taking an anticoagulant?  If so, what is the diagnosis the patient has been given to warrant the need for the anticoagulant? eliquis     Additional Notes: cardiac risk request and med hold on eliquis

## 2022-06-23 ENCOUNTER — TELEPHONE (OUTPATIENT)
Dept: CARDIOLOGY CLINIC | Age: 77
End: 2022-06-23

## 2022-06-23 NOTE — TELEPHONE ENCOUNTER
Nghia España with [id number] called requesting last office visit notes and EKG results. Pt has procedure with Dr. Debora Raymundo 07/01/22 and the anesthesiologist would like to review before then. Nghia España ask if it's possible to fax to: [phone] today or tomorrow. Contact if needed.     Thank you

## 2022-08-01 RX ORDER — APIXABAN 5 MG/1
TABLET, FILM COATED ORAL
Qty: 60 TABLET | Refills: 3 | Status: SHIPPED | OUTPATIENT
Start: 2022-08-01

## 2022-10-06 ENCOUNTER — TELEPHONE (OUTPATIENT)
Dept: CARDIOLOGY CLINIC | Age: 77
End: 2022-10-06

## 2022-11-16 ENCOUNTER — OFFICE VISIT (OUTPATIENT)
Dept: CARDIOLOGY CLINIC | Age: 77
End: 2022-11-16
Payer: MEDICARE

## 2022-11-16 VITALS
HEIGHT: 70 IN | WEIGHT: 213 LBS | DIASTOLIC BLOOD PRESSURE: 74 MMHG | HEART RATE: 86 BPM | SYSTOLIC BLOOD PRESSURE: 130 MMHG | BODY MASS INDEX: 30.49 KG/M2 | OXYGEN SATURATION: 98 %

## 2022-11-16 DIAGNOSIS — I48.21 PERMANENT ATRIAL FIBRILLATION (HCC): Primary | ICD-10-CM

## 2022-11-16 DIAGNOSIS — I10 ESSENTIAL HYPERTENSION: ICD-10-CM

## 2022-11-16 DIAGNOSIS — I35.0 MILD AORTIC VALVE STENOSIS: ICD-10-CM

## 2022-11-16 PROCEDURE — G8427 DOCREV CUR MEDS BY ELIG CLIN: HCPCS | Performed by: CLINICAL NURSE SPECIALIST

## 2022-11-16 PROCEDURE — 99214 OFFICE O/P EST MOD 30 MIN: CPT | Performed by: CLINICAL NURSE SPECIALIST

## 2022-11-16 PROCEDURE — 1036F TOBACCO NON-USER: CPT | Performed by: CLINICAL NURSE SPECIALIST

## 2022-11-16 PROCEDURE — G8484 FLU IMMUNIZE NO ADMIN: HCPCS | Performed by: CLINICAL NURSE SPECIALIST

## 2022-11-16 PROCEDURE — 1123F ACP DISCUSS/DSCN MKR DOCD: CPT | Performed by: CLINICAL NURSE SPECIALIST

## 2022-11-16 PROCEDURE — 3078F DIAST BP <80 MM HG: CPT | Performed by: CLINICAL NURSE SPECIALIST

## 2022-11-16 PROCEDURE — G8417 CALC BMI ABV UP PARAM F/U: HCPCS | Performed by: CLINICAL NURSE SPECIALIST

## 2022-11-16 PROCEDURE — 3074F SYST BP LT 130 MM HG: CPT | Performed by: CLINICAL NURSE SPECIALIST

## 2022-11-16 ASSESSMENT — ENCOUNTER SYMPTOMS
FACIAL SWELLING: 0
NAUSEA: 0
VOMITING: 0
ABDOMINAL PAIN: 0
CHEST TIGHTNESS: 0
EYE REDNESS: 0
COUGH: 0
WHEEZING: 0
SHORTNESS OF BREATH: 0

## 2022-11-16 NOTE — PROGRESS NOTES
[facility], [address], [address]  [address]  Phone: [phone]  Fax: [phone]    OFFICE VISIT:  2022    Mercedes Calhoun - : 1945    Reason For Visit:  Scotty Herndon is a 65 y.o. male who is here for 6 Month Follow-Up and Atrial Fibrillation       Diagnosis Orders   1. Permanent atrial fibrillation ([address]        2. Essential hypertension        3. Mild aortic valve stenosis                HPI  Patient follows with our office with a longstanding history of atrial fibrillation, aortic stenosis, hypertension, former history of smoking. Patient initially presented to our office in , he was not on anticoagulation and Eliquis was started. He denies palpitations or fast heart rates. He denies any unusual dyspnea, chest pain, orthopnea, PND, edema. 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 He states he check with his insurance company and Xarelto would be more affordable and he wants to know if he can make a change     Jake Gusman is PCP.   Darriontracy Barbara Zamudio has the following history as recorded in [facility]:    Patient Active Problem List    Diagnosis Date Noted    Permanent atrial fibrillation ([address] 2021    Mild aortic valve stenosis 2021    Essential hypertension 2021    Cholangitis     Common bile duct (CBD) obstruction 2021     Past Medical History:   Diagnosis Date    Arthritis     Atrial fibrillation (HCC)     Chronic kidney disease     COPD (chronic obstructive pulmonary disease) (HCC)     Emphysema of lung (HCC)     GERD (gastroesophageal reflux disease)     Gout     Hypertension     Pneumonia      Past Surgical History:   Procedure Laterality Date    APPENDECTOMY      CHOLECYSTECTOMY, LAPAROSCOPIC N/A 10/01/2021    LAPAROSCOPIC ROBOT ASSISTED CHOLECYSTECTOMY WITH FIREFLY ICG performed by Clary Barrera MD at [facility] OR    ERCP N/A 09/01/2021    Dr CARLO Bowling-w/placement of a 10F x 7 cm biliary stent and a 7F x 7 cm pancreatic stent, stone removal-Biliary obstruction due to stone/sludge, 3 month repeat    ERCP N/A 10/13/2021    Dr Jluis Bowling-julianna/stone and biliary and pancreatic stent removal-Choledocholithiasis and sludge    TONSILLECTOMY       Family History   Problem Relation Age of Onset    Cancer Mother     Cancer Father     No Known Problems Sister      Social History     Tobacco Use    Smoking status: Former     Types: Cigarettes    Smokeless tobacco: Never    Tobacco comments:     quit smoking 30 yrs ago   Substance Use Topics    Alcohol use: Not Currently      Current Outpatient Medications   Medication Sig Dispense Refill    rivaroxaban (XARELTO) 20 MG TABS tablet Take 1 tablet by mouth Daily with supper 30 tablet 5    metoprolol tartrate (LOPRESSOR) 25 MG tablet Take 1 tablet by mouth 2 times daily 180 tablet 3    Ascorbic Acid (VITAMIN C) 250 MG tablet Take 250 mg by mouth daily      ondansetron (ZOFRAN ODT) 4 MG disintegrating tablet Take 1 tablet by mouth every 8 hours as needed for Nausea or Vomiting 10 tablet 2    Omega-3 1000 MG CAPS Take 1 capsule by mouth daily      allopurinol (ZYLOPRIM) 100 MG tablet Take 100 mg by mouth daily Patient takes half a tablet      losartan (COZAAR) 50 MG tablet Take 50 mg by mouth daily      dilTIAZem (CARDIZEM CD) 240 MG extended release capsule Take 240 mg by mouth daily      omeprazole (PRILOSEC) 20 MG delayed release capsule Take 40 mg by mouth daily      colchicine (COLCRYS) 0.6 MG tablet Take 0.6 mg by mouth daily as needed for Pain      Multiple Vitamins-Minerals (THERAPEUTIC MULTIVITAMIN-MINERALS) tablet Take 1 tablet by mouth daily      melatonin 5 MG TBDP disintegrating tablet Take 1 tablet by mouth nightly (Patient taking differently: Take 5 mg by mouth nightly as needed ) 30 tablet 0     No current facility-administered medications for this visit.      Allergies: Patient has no known allergies. Review of Systems  Review of Systems   Constitutional:  Negative for activity change, diaphoresis, fatigue, fever and unexpected weight change. HENT:  Negative for facial swelling and nosebleeds. Eyes:  Negative for redness and visual disturbance. Respiratory:  Negative for cough, chest tightness, shortness of breath and wheezing. Cardiovascular:  Negative for chest pain, palpitations and leg swelling. Gastrointestinal:  Negative for abdominal pain, nausea and vomiting. Endocrine: Negative for cold intolerance and heat intolerance. Genitourinary:  Negative for dysuria and hematuria. Musculoskeletal:  Negative for arthralgias and myalgias. Skin:  Negative for pallor and rash. Neurological:  Negative for dizziness, seizures, syncope, weakness and light-headedness. Hematological:  Does not bruise/bleed easily. Psychiatric/Behavioral:  Negative for agitation. The patient is not nervous/anxious. Objective  Vital Signs - /74   Pulse 86   Ht 5' 10\" (1.778 m)   Wt 213 lb (96.6 kg)   SpO2 98%   BMI 30.56 kg/m²   Physical Exam  Vitals and nursing note reviewed. Constitutional:       General: He is not in acute distress. Appearance: He is well-developed. He is not diaphoretic. HENT:      Head: Normocephalic and atraumatic. Right Ear: Hearing and external ear normal.      Left Ear: Hearing and external ear normal.      Nose: Nose normal.   Eyes:      General:         Right eye: No discharge. Left eye: No discharge. Pupils: Pupils are equal, round, and reactive to light. Neck:      Thyroid: No thyromegaly. Vascular: No carotid bruit or JVD. Trachea: No tracheal deviation. Cardiovascular:      Rate and Rhythm: Normal rate. Rhythm irregular. Heart sounds: Murmur (2/6/systolic) heard. No friction rub. No gallop. Pulmonary:      Effort: Pulmonary effort is normal. No respiratory distress.       Breath sounds: Normal breath sounds. No wheezing or rales. Abdominal:      Palpations: Abdomen is soft. Tenderness: There is no abdominal tenderness. Musculoskeletal:         General: No swelling or deformity. Cervical back: Neck supple. No muscular tenderness. Right lower leg: No edema. Left lower leg: No edema. Skin:     General: Skin is warm and dry. Findings: No rash. Neurological:      General: No focal deficit present. Mental Status: He is alert and oriented to person, place, and time. Cranial Nerves: No cranial nerve deficit. Psychiatric:         Mood and Affect: Mood normal.         Behavior: Behavior normal.         Judgment: Judgment normal.       Data:  Lab Results   Component Value Date/Time    WBC 16.8 09/02/2021 02:07 AM    RBC 4.71 09/02/2021 02:07 AM    HGB 13.1 09/02/2021 02:07 AM    HCT 40.8 09/02/2021 02:07 AM     09/02/2021 02:07 AM      No results found for: CHOL, TRIG, HDL, LDLCALC  Lab Results   Component Value Date/Time     09/02/2021 02:07 AM    K 4.2 09/02/2021 02:07 AM     09/02/2021 02:07 AM    CO2 22 09/02/2021 02:07 AM    GLUCOSE 170 09/02/2021 02:07 AM    BUN 21 09/02/2021 02:07 AM    CREATININE 1.3 09/02/2021 02:07 AM    CALCIUM 8.8 09/02/2021 02:07 AM     09/02/2021 02:07 AM     09/02/2021 02:07 AM     Lab Results   Component Value Date/Time    TSH 2.030 09/01/2021 11:26 AM       Echo 9/1/21  Summary   Rhythm is noted to be atrial fibrillation. Normal left ventricular chamber size and systolic function. Moderate concentric left ventricular hypertrophy. Left ventricular ejection fraction is visually estimated at 65%. Aortic valve leaflets are mildly thickened. There is mildly decreased   excursion. Mild aortic stenosis is present. The peak gradient across the aortic valve is 36 mmHg. The mean gradient across the aortic valve is 19 mmHg. The left atrium is mildly dilated.       [id number] Score for Atrial Fibrillation Stroke Risk   Risk   Factors  Component Value   C CHF No 0   H HTN Yes 1   A2 Age >= 76 Yes,  (73 y.o.) 2   D DM No 0   S2 Prior Stroke/TIA No 0   V Vascular Disease No 0   A Age 74-69 No,  (73 y.o.) 0   Sc Sex male 0    [id number]  Score  3   Score last updated 9/10/21 6:90 AM CDT    Click here for a link to the UpToDate guideline \"Atrial Fibrillation: Anticoagulation therapy to prevent embolization    Assessment:     Diagnosis Orders   1. Permanent atrial fibrillation ([address]        2. Essential hypertension        3. Mild aortic valve stenosis              Permanent atrial fibrillation- stable rate control on metoprolol and diltiazem. Remains anticoagulated with Eliquis without bleeding or falling issues. Okay to change from Eliquis to Xarelto. He will finish off his Eliquis on Friday. On Saturday he will start Xarelto 20 mg daily with supper. Emphasized that the Xarelto is a once a day medication only. Samples and 30-day free trial card on Xarelto provided    Hypertension-stable on current regimen with metoprolol and diltiazem as well as losartan. Aortic stenosis-mild per  Echo 2021. Stable, continue to monitor    Plan    No orders of the defined types were placed in this encounter. Return in about 6 months (around 5/16/2023) for ELYSE. Take Eliquis until finished on Friday  Start Xarelto 20mg with supper beginning on Saturday    Call with any questionsor concerns  Follow up with Jake Gusman for non cardiac problems  Report any new problems  Cardiovascular Fitness-Exercise as tolerated. Strive for 15 minutes of exercise most days of the week. Cardiac / HealthyDiet  Continue current medications as directed  Continue plan of treatment  It is always recommended that you bring your medicationsbottles with you to each visit - this is for your safety!        Raeann Partida, ELYSE

## 2022-11-16 NOTE — PATIENT INSTRUCTIONS
Return in about 6 months (around 5/16/2023) for APRN.    Take Eliquis until finished on Friday  Start Xarelto 20mg with supper beginning on Saturday

## 2022-11-18 ENCOUNTER — TELEPHONE (OUTPATIENT)
Dept: CARDIOLOGY CLINIC | Age: 77
End: 2022-11-18

## 2022-11-18 NOTE — TELEPHONE ENCOUNTER
Pt left  stating Mrs. Lorrie Clement gave him a card to help with his Xarelto and the pharm states that one had already been used.  Pt needs a new card sent to [facility] in [address]

## 2022-11-23 NOTE — TELEPHONE ENCOUNTER
Patient is going to need patient assistance done for the xarelto 20 mg. I have placed samples till the first of the year for patient till we can get this done. He voiced understanding. Will you see if abisai can help us with this?

## 2023-01-16 ENCOUNTER — TELEPHONE (OUTPATIENT)
Dept: CARDIOLOGY CLINIC | Age: 78
End: 2023-01-16

## 2023-01-16 NOTE — TELEPHONE ENCOUNTER
Cherry Dumas from the [id number] in [address] is requesting notes from last OV. Patient is scheduled to have surgery this week on his right Trigger Finger.     Thank you    Fax [phone]    Ph: [phone] ext [phone]

## 2023-01-17 ENCOUNTER — TELEPHONE (OUTPATIENT)
Dept: CARDIOLOGY CLINIC | Age: 78
End: 2023-01-17

## 2023-01-17 NOTE — TELEPHONE ENCOUNTER
Date: 1-    Cardiologist: Dr. Cornelia Aguilar    Procedure: trigger finger release under local block    Surgeon: Breonna Deng of [facility] Office Visit: 11-16-22    Reason for office visit and medical concerns addressed at this office visit: PAF, HTN, CKD    Testing Performed and Date of Service:  EKG 5-12-22    RCRI = 0 pts, low, 0.4%   METs 4    Current Medications: xarelto, lopressor, vit C, ondansetron, zyloprim, cozaar, cardizem cd, prilosec, colchicine, melatonin 5 mg     Is the patient currently taking an anticoagulant? If so, what is the diagnosis the patient has been given to warrant the need for the anticoagulant?  Xarelto     Additional Notes: cardiac risk request and med hold on xarelto

## 2023-05-18 ENCOUNTER — OFFICE VISIT (OUTPATIENT)
Dept: CARDIOLOGY CLINIC | Age: 78
End: 2023-05-18
Payer: MEDICARE

## 2023-05-18 VITALS
HEIGHT: 70 IN | HEART RATE: 93 BPM | OXYGEN SATURATION: 95 % | BODY MASS INDEX: 30.49 KG/M2 | DIASTOLIC BLOOD PRESSURE: 70 MMHG | SYSTOLIC BLOOD PRESSURE: 112 MMHG | WEIGHT: 213 LBS

## 2023-05-18 DIAGNOSIS — I10 ESSENTIAL HYPERTENSION: ICD-10-CM

## 2023-05-18 DIAGNOSIS — I35.0 MILD AORTIC VALVE STENOSIS: ICD-10-CM

## 2023-05-18 DIAGNOSIS — I48.21 PERMANENT ATRIAL FIBRILLATION (HCC): Primary | ICD-10-CM

## 2023-05-18 PROCEDURE — G8427 DOCREV CUR MEDS BY ELIG CLIN: HCPCS | Performed by: CLINICAL NURSE SPECIALIST

## 2023-05-18 PROCEDURE — 1036F TOBACCO NON-USER: CPT | Performed by: CLINICAL NURSE SPECIALIST

## 2023-05-18 PROCEDURE — 99214 OFFICE O/P EST MOD 30 MIN: CPT | Performed by: CLINICAL NURSE SPECIALIST

## 2023-05-18 PROCEDURE — G8417 CALC BMI ABV UP PARAM F/U: HCPCS | Performed by: CLINICAL NURSE SPECIALIST

## 2023-05-18 PROCEDURE — 3074F SYST BP LT 130 MM HG: CPT | Performed by: CLINICAL NURSE SPECIALIST

## 2023-05-18 PROCEDURE — 3078F DIAST BP <80 MM HG: CPT | Performed by: CLINICAL NURSE SPECIALIST

## 2023-05-18 PROCEDURE — 1123F ACP DISCUSS/DSCN MKR DOCD: CPT | Performed by: CLINICAL NURSE SPECIALIST

## 2023-05-18 ASSESSMENT — ENCOUNTER SYMPTOMS
VOMITING: 0
ABDOMINAL PAIN: 0
WHEEZING: 0
SHORTNESS OF BREATH: 0
EYE REDNESS: 0
NAUSEA: 0
COUGH: 0
CHEST TIGHTNESS: 0
FACIAL SWELLING: 0

## 2023-05-18 NOTE — PROGRESS NOTES
mmHg.   The left atrium is mildly dilated. [id number] Score for Atrial Fibrillation Stroke Risk   Risk   Factors  Component Value   C CHF No 0   H HTN Yes 1   A2 Age >= 76 Yes,  (73 y.o.) 2   D DM No 0   S2 Prior Stroke/TIA No 0   V Vascular Disease No 0   A Age 74-69 No,  (73 y.o.) 0   Sc Sex male 0    [id number]  Score  3   Score last updated 9/10/21 1:82 AM CDT    Click here for a link to the UpToDate guideline \"Atrial Fibrillation: Anticoagulation therapy to prevent embolization    Assessment:     Diagnosis Orders   1. Permanent atrial fibrillation ([facility]        2. Essential hypertension        3. Mild aortic valve stenosis                Permanent atrial fibrillation- stable rate control on metoprolol and diltiazem. Remains anticoagulated with Eliquis without bleeding or falling issues. Okay to change from Eliquis to Xarelto. He will finish off his Eliquis on Friday. On Saturday he will start Xarelto 20 mg daily with supper. Emphasized that the Xarelto is a once a day medication only. Samples and 30-day free trial card on Xarelto provided    Hypertension-stable on current regimen with metoprolol and diltiazem as well as losartan. Aortic stenosis-mild per  Echo 2021. Stable, continue to monitor    Plan      Return in about 9 months (around 2/18/2024) for APRN. Call with any questionsor concerns  Follow up with Ludmila Longoria for non cardiac problems  Report any new problems  Cardiovascular Fitness-Exercise as tolerated. Strive for 15 minutes of exercise most days of the week. Cardiac / HealthyDiet  Continue current medications as directed  Continue plan of treatment  It is always recommended that you bring your medicationsbottles with you to each visit - this is for your safety!        Varun Carrasquillo, APRN

## 2023-07-14 RX ORDER — RIVAROXABAN 20 MG/1
TABLET, FILM COATED ORAL
Qty: 90 TABLET | Refills: 5 | Status: SHIPPED | OUTPATIENT
Start: 2023-07-14

## 2024-02-20 ENCOUNTER — TELEPHONE (OUTPATIENT)
Dept: CARDIOLOGY CLINIC | Age: 79
End: 2024-02-20

## 2024-02-20 ENCOUNTER — OFFICE VISIT (OUTPATIENT)
Dept: CARDIOLOGY CLINIC | Age: 79
End: 2024-02-20
Payer: MEDICARE

## 2024-02-20 VITALS
SYSTOLIC BLOOD PRESSURE: 110 MMHG | BODY MASS INDEX: 30.49 KG/M2 | DIASTOLIC BLOOD PRESSURE: 72 MMHG | WEIGHT: 213 LBS | HEIGHT: 70 IN | HEART RATE: 82 BPM

## 2024-02-20 DIAGNOSIS — I35.0 MILD AORTIC VALVE STENOSIS: ICD-10-CM

## 2024-02-20 DIAGNOSIS — I10 ESSENTIAL HYPERTENSION: ICD-10-CM

## 2024-02-20 DIAGNOSIS — I48.21 PERMANENT ATRIAL FIBRILLATION (HCC): Primary | ICD-10-CM

## 2024-02-20 PROCEDURE — 93000 ELECTROCARDIOGRAM COMPLETE: CPT | Performed by: CLINICAL NURSE SPECIALIST

## 2024-02-20 PROCEDURE — 99214 OFFICE O/P EST MOD 30 MIN: CPT | Performed by: CLINICAL NURSE SPECIALIST

## 2024-02-20 PROCEDURE — G8484 FLU IMMUNIZE NO ADMIN: HCPCS | Performed by: CLINICAL NURSE SPECIALIST

## 2024-02-20 PROCEDURE — 3074F SYST BP LT 130 MM HG: CPT | Performed by: CLINICAL NURSE SPECIALIST

## 2024-02-20 PROCEDURE — G8417 CALC BMI ABV UP PARAM F/U: HCPCS | Performed by: CLINICAL NURSE SPECIALIST

## 2024-02-20 PROCEDURE — 1036F TOBACCO NON-USER: CPT | Performed by: CLINICAL NURSE SPECIALIST

## 2024-02-20 PROCEDURE — G8427 DOCREV CUR MEDS BY ELIG CLIN: HCPCS | Performed by: CLINICAL NURSE SPECIALIST

## 2024-02-20 PROCEDURE — 1123F ACP DISCUSS/DSCN MKR DOCD: CPT | Performed by: CLINICAL NURSE SPECIALIST

## 2024-02-20 PROCEDURE — 3078F DIAST BP <80 MM HG: CPT | Performed by: CLINICAL NURSE SPECIALIST

## 2024-02-20 ASSESSMENT — ENCOUNTER SYMPTOMS
WHEEZING: 0
CHEST TIGHTNESS: 0
EYE REDNESS: 0
VOMITING: 0
NAUSEA: 0
ABDOMINAL PAIN: 0
FACIAL SWELLING: 0
COUGH: 0
SHORTNESS OF BREATH: 0

## 2024-02-20 NOTE — TELEPHONE ENCOUNTER
Is there anything we can do to help this patient with assistance with Xarelto?  He recently switched from Eliquis, but states that Xarelto is still very high

## 2024-02-20 NOTE — PROGRESS NOTES
mmHg.   The left atrium is mildly dilated.      [id number] Score for Atrial Fibrillation Stroke Risk   Risk   Factors  Component Value   C CHF No 0   H HTN Yes 1   A2 Age >= 75 Yes,  (78 y.o.) 2   D DM No 0   S2 Prior Stroke/TIA No 0   V Vascular Disease No 0   A Age 65-74 No,  (78 y.o.) 0   Sc Sex male 0    [id number]  Score  3   Score last updated 9/10/21 9:30 AM CDT    Click here for a link to the UpToDate guideline \"Atrial Fibrillation: Anticoagulation therapy to prevent embolization      EKG 2/20/2024 shows atrial fibrillation rate 82    Assessment:     Diagnosis Orders   1. Permanent atrial fibrillation (HCC)  EKG 12 lead      2. Essential hypertension        3. Mild aortic valve stenosis                Permanent atrial fibrillation- stable rate control on metoprolol and diltiazem.  Remains anticoagulated with Xarelto.  Still caused issues with Xarelto.  Will see if there is any assistance that can be provided    Hypertension-stable on current regimen with metoprolol and diltiazem as well as losartan.    Aortic stenosis-mild per  Echo 2021.  Stable, continue to monitor    Plan      Return in about 1 year (around 2/20/2025) for ELYSE.   Work on pt assistance for Xarelto    Call with any questionsor concerns  Follow up with Wade Martin for non cardiac problems  Report any new problems  Cardiovascular Fitness-Exercise as tolerated.  Strive for 15 minutes of exercise most days of the week.    Cardiac / HealthyDiet  Continue current medications as directed  Continue plan of treatment  It is always recommended that you bring your medicationsbottles with you to each visit - this is for your safety!       ELYSE John

## 2024-02-20 NOTE — PATIENT INSTRUCTIONS
Return in about 1 year (around 2/20/2025) for APRN.   Work on pt assistance for Xarelto  Fall precautions

## 2024-10-22 RX ORDER — RIVAROXABAN 20 MG/1
TABLET, FILM COATED ORAL
Qty: 90 TABLET | Refills: 2 | Status: SHIPPED | OUTPATIENT
Start: 2024-10-22

## 2025-01-01 ENCOUNTER — RESULTS FOLLOW-UP (OUTPATIENT)
Dept: CARDIOLOGY | Age: 80
End: 2025-01-01

## 2025-01-01 ENCOUNTER — TELEPHONE (OUTPATIENT)
Dept: CARDIOLOGY CLINIC | Age: 80
End: 2025-01-01

## 2025-02-27 ENCOUNTER — OFFICE VISIT (OUTPATIENT)
Dept: CARDIOLOGY CLINIC | Age: 80
End: 2025-02-27

## 2025-02-27 VITALS
SYSTOLIC BLOOD PRESSURE: 128 MMHG | HEIGHT: 70 IN | BODY MASS INDEX: 29.06 KG/M2 | HEART RATE: 85 BPM | DIASTOLIC BLOOD PRESSURE: 74 MMHG | WEIGHT: 203 LBS

## 2025-02-27 DIAGNOSIS — I10 ESSENTIAL HYPERTENSION: ICD-10-CM

## 2025-02-27 DIAGNOSIS — I35.0 MILD AORTIC VALVE STENOSIS: ICD-10-CM

## 2025-02-27 DIAGNOSIS — I48.21 PERMANENT ATRIAL FIBRILLATION (HCC): Primary | ICD-10-CM

## 2025-02-27 RX ORDER — METOPROLOL TARTRATE 25 MG/1
25 TABLET, FILM COATED ORAL 2 TIMES DAILY
Qty: 180 TABLET | Refills: 3 | Status: SHIPPED | OUTPATIENT
Start: 2025-02-27

## 2025-02-27 ASSESSMENT — ENCOUNTER SYMPTOMS
COUGH: 0
WHEEZING: 0
FACIAL SWELLING: 0
ABDOMINAL PAIN: 0
NAUSEA: 0
CHEST TIGHTNESS: 0
EYE REDNESS: 0
VOMITING: 0
SHORTNESS OF BREATH: 0

## 2025-02-27 NOTE — PATIENT INSTRUCTIONS
Fall precautions    [place] at the [facility] [place] located on the first floor of [facility].   Enter through hospital main entrance and turn immediately to your left.    Date/Time:     Patient's contact number:  [phone] (home)     Echocardiogram -  No prep.      Takes approximately 30 min.    An echocardiogram uses sound waves to produce images of your heart. This commonly used test allows your doctor to see how your heart is beating and pumping blood. Your doctor can use the images from an echocardiogram to identify various abnormalities in the heart muscle and valves.    This test has 2 parts:   You will be asked to disrobe from the waist up and given a gown to wear. The technologist will then hook up an EKG monitor to you for the entire exam.   You will then have an ultrasound of your heart (echocardiogram) to assess the heart muscle, heart valves and heart function.     You may eat and take any medicines before the exam.     If you need to change your appointment, please call outpatient scheduling at [phone].

## 2025-02-27 NOTE — PROGRESS NOTES
Vascular Disease No 0   A Age 65-74 No,  (79 y.o.) 0   Sc Sex male 0    [id number]  Score  3   Score last updated 9/10/21 9:30 AM CDT    Click here for a link to the UpToDate guideline \"Atrial Fibrillation: Anticoagulation therapy to prevent embolization      EKG 2/20/2024 shows atrial fibrillation rate 85    Assessment:     Diagnosis Orders   1. Permanent atrial fibrillation (HCC)  EKG 12 lead      2. Essential hypertension  EKG 12 lead      3. Mild aortic valve stenosis  EKG 12 lead    Echo (TTE) complete (PRN contrast/bubble/strain/3D)              Permanent atrial fibrillation- stable rate control on metoprolol and diltiazem.  Remains anticoagulated with Xarelto.      Hypertension-stable on current regimen with metoprolol and diltiazem as well as losartan.    Aortic stenosis-mild per  Echo 2021.  Repeat echo for monitoring    Plan      Return in about 1 year (around 2/27/2026) for ELYSE.   Sched Echo  Fall precautions    Call with any questionsor concerns  Follow up with Wade Martin for non cardiac problems  Report any new problems  Cardiovascular Fitness-Exercise as tolerated.  Strive for 15 minutes of exercise most days of the week.    Cardiac / HealthyDiet  Continue current medications as directed  Continue plan of treatment  It is always recommended that you bring your medicationsbottles with you to each visit - this is for your safety!       ELYSE John

## 2025-03-07 ENCOUNTER — HOSPITAL ENCOUNTER (OUTPATIENT)
Age: 80
Discharge: HOME | End: 2025-03-09
Payer: MEDICARE

## 2025-03-07 VITALS
HEIGHT: 70 IN | WEIGHT: 204 LBS | BODY MASS INDEX: 29.2 KG/M2 | SYSTOLIC BLOOD PRESSURE: 123 MMHG | DIASTOLIC BLOOD PRESSURE: 69 MMHG

## 2025-03-07 DIAGNOSIS — I35.0 MILD AORTIC VALVE STENOSIS: ICD-10-CM

## 2025-03-07 PROCEDURE — C8929 TTE W OR WO FOL WCON,DOPPLER: HCPCS

## 2025-03-07 PROCEDURE — 6360000004 HC RX CONTRAST MEDICATION: Performed by: CLINICAL NURSE SPECIALIST

## 2025-03-07 RX ADMIN — SULFUR HEXAFLUORIDE 2 ML: 60.7; .19; .19 INJECTION, POWDER, LYOPHILIZED, FOR SUSPENSION INTRAVENOUS; INTRAVESICAL at 11:21

## 2025-03-08 LAB
ECHO AO ROOT DIAM: 2.3 CM
ECHO AO ROOT INDEX: 1.1 CM/M2
ECHO AV AREA PEAK VELOCITY: 0.8 CM2
ECHO AV AREA VTI: 0.8 CM2
ECHO AV AREA/BSA PEAK VELOCITY: 0.4 CM2/M2
ECHO AV AREA/BSA VTI: 0.4 CM2/M2
ECHO AV MEAN GRADIENT: 29 MMHG
ECHO AV MEAN VELOCITY: 2.5 M/S
ECHO AV PEAK GRADIENT: 41 MMHG
ECHO AV PEAK VELOCITY: 3.2 M/S
ECHO AV VELOCITY RATIO: 0.25
ECHO AV VTI: 78.3 CM
ECHO BSA: 2.14 M2
ECHO EST RA PRESSURE: 3 MMHG
ECHO IVC PROX: 1.6 CM
ECHO LA AREA 2C: 26.2 CM2
ECHO LA AREA 4C: 27.5 CM2
ECHO LA DIAMETER INDEX: 2.1 CM/M2
ECHO LA DIAMETER: 4.4 CM
ECHO LA MAJOR AXIS: 7.5 CM
ECHO LA MINOR AXIS: 7 CM
ECHO LA TO AORTIC ROOT RATIO: 1.91
ECHO LA VOL BP: 84 ML (ref 18–58)
ECHO LA VOL MOD A2C: 82 ML (ref 18–58)
ECHO LA VOL MOD A4C: 81 ML (ref 18–58)
ECHO LA VOL/BSA BIPLANE: 40 ML/M2 (ref 16–34)
ECHO LA VOLUME INDEX MOD A2C: 39 ML/M2 (ref 16–34)
ECHO LA VOLUME INDEX MOD A4C: 39 ML/M2 (ref 16–34)
ECHO LV E' LATERAL VELOCITY: 8.7 CM/S
ECHO LV E' SEPTAL VELOCITY: 7.72 CM/S
ECHO LV EDV A2C: 63 ML
ECHO LV EDV A4C: 66 ML
ECHO LV EDV INDEX A4C: 31 ML/M2
ECHO LV EDV NDEX A2C: 30 ML/M2
ECHO LV EF PHYSICIAN: 59 %
ECHO LV EJECTION FRACTION A2C: 61 %
ECHO LV EJECTION FRACTION A4C: 59 %
ECHO LV EJECTION FRACTION BIPLANE: 59 % (ref 55–100)
ECHO LV ESV A2C: 25 ML
ECHO LV ESV A4C: 27 ML
ECHO LV ESV INDEX A2C: 12 ML/M2
ECHO LV ESV INDEX A4C: 13 ML/M2
ECHO LV FRACTIONAL SHORTENING: 37 % (ref 28–44)
ECHO LV INTERNAL DIMENSION DIASTOLE INDEX: 2.05 CM/M2
ECHO LV INTERNAL DIMENSION DIASTOLIC: 4.3 CM (ref 4.2–5.9)
ECHO LV INTERNAL DIMENSION SYSTOLIC INDEX: 1.29 CM/M2
ECHO LV INTERNAL DIMENSION SYSTOLIC: 2.7 CM
ECHO LV IVSD: 1.2 CM (ref 0.6–1)
ECHO LV MASS 2D: 173.6 G (ref 88–224)
ECHO LV MASS INDEX 2D: 82.7 G/M2 (ref 49–115)
ECHO LV POSTERIOR WALL DIASTOLIC: 1.1 CM (ref 0.6–1)
ECHO LV RELATIVE WALL THICKNESS RATIO: 0.51
ECHO LVOT AREA: 3.5 CM2
ECHO LVOT AV VTI INDEX: 0.22
ECHO LVOT DIAM: 2.1 CM
ECHO LVOT MEAN GRADIENT: 1 MMHG
ECHO LVOT PEAK GRADIENT: 2 MMHG
ECHO LVOT PEAK VELOCITY: 0.8 M/S
ECHO LVOT STROKE VOLUME INDEX: 27.9 ML/M2
ECHO LVOT SV: 58.5 ML
ECHO LVOT VTI: 16.9 CM
ECHO MV E DECELERATION TIME (DT): 156 MS
ECHO MV E VELOCITY: 1.26 M/S
ECHO MV E/E' LATERAL: 14.48
ECHO MV E/E' RATIO (AVERAGED): 15.4
ECHO MV E/E' SEPTAL: 16.32
ECHO RA AREA 4C: 19.9 CM2
ECHO RA END SYSTOLIC VOLUME APICAL 4 CHAMBER INDEX BSA: 25 ML/M2
ECHO RA VOLUME: 53 ML
ECHO RIGHT VENTRICULAR SYSTOLIC PRESSURE (RVSP): 37 MMHG
ECHO RV BASAL DIMENSION: 4 CM
ECHO RV INTERNAL DIMENSION: 2.8 CM
ECHO RV LONGITUDINAL DIMENSION: 5.7 CM
ECHO RV MID DIMENSION: 2.6 CM
ECHO RV TAPSE: 1.7 CM (ref 1.7–?)
ECHO TV REGURGITANT MAX VELOCITY: 2.93 M/S
ECHO TV REGURGITANT PEAK GRADIENT: 34 MMHG

## 2025-03-08 PROCEDURE — 93306 TTE W/DOPPLER COMPLETE: CPT | Performed by: INTERNAL MEDICINE

## 2025-03-10 ENCOUNTER — RESULTS FOLLOW-UP (OUTPATIENT)
Age: 80
End: 2025-03-10

## 2025-03-14 ENCOUNTER — OFFICE VISIT (OUTPATIENT)
Dept: CARDIOLOGY CLINIC | Age: 80
End: 2025-03-14
Payer: MEDICARE

## 2025-03-14 VITALS
HEIGHT: 70 IN | DIASTOLIC BLOOD PRESSURE: 62 MMHG | SYSTOLIC BLOOD PRESSURE: 128 MMHG | WEIGHT: 201 LBS | HEART RATE: 86 BPM | BODY MASS INDEX: 28.77 KG/M2 | OXYGEN SATURATION: 96 %

## 2025-03-14 DIAGNOSIS — I48.0 PAROXYSMAL ATRIAL FIBRILLATION (HCC): ICD-10-CM

## 2025-03-14 DIAGNOSIS — K43.9 VENTRAL HERNIA WITHOUT OBSTRUCTION OR GANGRENE: ICD-10-CM

## 2025-03-14 DIAGNOSIS — I35.0 SEVERE AORTIC STENOSIS: Primary | ICD-10-CM

## 2025-03-14 PROCEDURE — 1159F MED LIST DOCD IN RCRD: CPT | Performed by: INTERNAL MEDICINE

## 2025-03-14 PROCEDURE — 3078F DIAST BP <80 MM HG: CPT | Performed by: INTERNAL MEDICINE

## 2025-03-14 PROCEDURE — 99204 OFFICE O/P NEW MOD 45 MIN: CPT | Performed by: INTERNAL MEDICINE

## 2025-03-14 PROCEDURE — 1123F ACP DISCUSS/DSCN MKR DOCD: CPT | Performed by: INTERNAL MEDICINE

## 2025-03-14 PROCEDURE — 3074F SYST BP LT 130 MM HG: CPT | Performed by: INTERNAL MEDICINE

## 2025-03-14 ASSESSMENT — ENCOUNTER SYMPTOMS: SHORTNESS OF BREATH: 1

## 2025-03-14 NOTE — PROGRESS NOTES
Disp: 180 tablet, Rfl: 3    Ascorbic Acid (VITAMIN C) 250 MG tablet, Take 1 tablet by mouth daily, Disp: , Rfl:     Omega-3 1000 MG CAPS, Take 1 capsule by mouth daily, Disp: , Rfl:     allopurinol (ZYLOPRIM) 100 MG tablet, Take 1 tablet by mouth daily Patient takes half a tablet, Disp: , Rfl:     losartan (COZAAR) 50 MG tablet, Take 1 tablet by mouth daily, Disp: , Rfl:     dilTIAZem (CARDIZEM CD) 240 MG extended release capsule, Take 1 capsule by mouth daily, Disp: , Rfl:     omeprazole (PRILOSEC) 40 MG delayed release capsule, Take 1 capsule by mouth daily, Disp: , Rfl:     colchicine (COLCRYS) 0.6 MG tablet, Take 1 tablet by mouth daily as needed for Pain, Disp: , Rfl:      Allergies     No Known Allergies     Past History     Past Medical History:   Diagnosis Date    Arthritis     Atrial fibrillation (HCC)     Chronic kidney disease     COPD (chronic obstructive pulmonary disease) (HCC)     Emphysema of lung (HCC)     GERD (gastroesophageal reflux disease)     Gout     Hypertension     Pneumonia      Past Surgical History:   Procedure Laterality Date    APPENDECTOMY      CARPAL TUNNEL RELEASE      2023    CHOLECYSTECTOMY, LAPAROSCOPIC N/A 10/01/2021    LAPAROSCOPIC ROBOT ASSISTED CHOLECYSTECTOMY WITH FIREFLY ICG performed by Lindy Biggs MD at [facility] OR    ERCP N/A 09/01/2021    Dr CARLO Dean/placement of a 10F x 7 cm biliary stent and a 7F x 7 cm pancreatic stent, stone removal-Biliary obstruction due to stone/sludge, 3 month repeat    ERCP N/A 10/13/2021    Dr CARLO Dean/stone and biliary and pancreatic stent removal-Choledocholithiasis and sludge    FINGER TRIGGER RELEASE      2023    TONSILLECTOMY       [unfilled]   Family History   Problem Relation Age of Onset    Cancer Mother     Cancer Father     No Known Problems Sister         Review of Systems     Review of Systems   Respiratory:  Positive for shortness of breath.    All other systems reviewed and are negative.         Physical Examination     Vitals:

## 2025-03-17 DIAGNOSIS — I35.0 SEVERE AORTIC STENOSIS: Primary | ICD-10-CM

## 2025-03-18 ENCOUNTER — TELEPHONE (OUTPATIENT)
Dept: CARDIOLOGY CLINIC | Age: 80
End: 2025-03-18

## 2025-03-18 DIAGNOSIS — I35.0 SEVERE AORTIC STENOSIS: Primary | ICD-10-CM

## 2025-03-18 NOTE — TELEPHONE ENCOUNTER
Spoke with Patient  on 3/17/2025. Scheduled Left and Right Heart Catheterization on 3/27/2025.  Gave instructions on arrival time, nothing to eat or drink after midnight. Hold Xarelto / 2 days prior to the procedure. Pt voiced understanding. Recommended bringing an over night bag.

## 2025-03-27 ENCOUNTER — HOSPITAL ENCOUNTER (INPATIENT)
Age: 80
LOS: 1 days | Discharge: HOME | End: 2025-03-28
Attending: INTERNAL MEDICINE | Admitting: INTERNAL MEDICINE
Payer: MEDICARE

## 2025-03-27 DIAGNOSIS — I48.0 PAROXYSMAL ATRIAL FIBRILLATION (HCC): ICD-10-CM

## 2025-03-27 DIAGNOSIS — R00.0 TACHYCARDIA: Primary | ICD-10-CM

## 2025-03-27 DIAGNOSIS — I35.0 SEVERE AORTIC STENOSIS: ICD-10-CM

## 2025-03-27 PROBLEM — Z98.890 S/P CARDIAC CATH: Status: ACTIVE | Noted: 2025-03-27

## 2025-03-27 LAB
ANION GAP SERPL CALCULATED.3IONS-SCNC: 14 MMOL/L (ref 8–16)
BUN SERPL-MCNC: 16 MG/DL (ref 8–23)
CALCIUM SERPL-MCNC: 9.1 MG/DL (ref 8.8–10.2)
CHLORIDE SERPL-SCNC: 102 MMOL/L (ref 98–107)
CO2 SERPL-SCNC: 24 MMOL/L (ref 22–29)
CREAT SERPL-MCNC: 1.3 MG/DL (ref 0.7–1.2)
ECHO BSA: 2.08 M2
ERYTHROCYTE [DISTWIDTH] IN BLOOD BY AUTOMATED COUNT: 15.2 % (ref 11.5–14.5)
GLUCOSE SERPL-MCNC: 117 MG/DL (ref 70–99)
HCT VFR BLD AUTO: 46.7 % (ref 42–52)
HGB BLD-MCNC: 14.9 G/DL (ref 14–18)
MCH RBC QN AUTO: 29 PG (ref 27–31)
MCHC RBC AUTO-ENTMCNC: 31.9 G/DL (ref 33–37)
MCV RBC AUTO: 91 FL (ref 80–94)
PLATELET # BLD AUTO: 183 K/UL (ref 130–400)
PMV BLD AUTO: 11.2 FL (ref 9.4–12.4)
POTASSIUM SERPL-SCNC: 3.7 MMOL/L (ref 3.5–5.1)
RBC # BLD AUTO: 5.13 M/UL (ref 4.7–6.1)
SODIUM SERPL-SCNC: 140 MMOL/L (ref 136–145)
WBC # BLD AUTO: 12.9 K/UL (ref 4.8–10.8)

## 2025-03-27 PROCEDURE — 85027 COMPLETE CBC AUTOMATED: CPT

## 2025-03-27 PROCEDURE — 99153 MOD SED SAME PHYS/QHP EA: CPT | Performed by: INTERNAL MEDICINE

## 2025-03-27 PROCEDURE — 99152 MOD SED SAME PHYS/QHP 5/>YRS: CPT | Performed by: INTERNAL MEDICINE

## 2025-03-27 PROCEDURE — C1769 GUIDE WIRE: HCPCS | Performed by: INTERNAL MEDICINE

## 2025-03-27 PROCEDURE — 93458 L HRT ARTERY/VENTRICLE ANGIO: CPT | Performed by: INTERNAL MEDICINE

## 2025-03-27 PROCEDURE — 027034Z DILATION OF CORONARY ARTERY, ONE ARTERY WITH DRUG-ELUTING INTRALUMINAL DEVICE, PERCUTANEOUS APPROACH: ICD-10-PCS | Performed by: INTERNAL MEDICINE

## 2025-03-27 PROCEDURE — 6360000004 HC RX CONTRAST MEDICATION: Performed by: INTERNAL MEDICINE

## 2025-03-27 PROCEDURE — 92953 TEMPORARY EXTERNAL PACING: CPT

## 2025-03-27 PROCEDURE — 7100000010 HC PHASE II RECOVERY - FIRST 15 MIN: Performed by: INTERNAL MEDICINE

## 2025-03-27 PROCEDURE — 2000000000 HC ICU R&B

## 2025-03-27 PROCEDURE — 2709999900 HC NON-CHARGEABLE SUPPLY: Performed by: INTERNAL MEDICINE

## 2025-03-27 PROCEDURE — 2580000003 HC RX 258: Performed by: INTERNAL MEDICINE

## 2025-03-27 PROCEDURE — 4A023N7 MEASUREMENT OF CARDIAC SAMPLING AND PRESSURE, LEFT HEART, PERCUTANEOUS APPROACH: ICD-10-PCS | Performed by: INTERNAL MEDICINE

## 2025-03-27 PROCEDURE — 99234 HOSP IP/OBS SM DT SF/LOW 45: CPT | Performed by: INTERNAL MEDICINE

## 2025-03-27 PROCEDURE — 80048 BASIC METABOLIC PNL TOTAL CA: CPT

## 2025-03-27 PROCEDURE — 6360000002 HC RX W HCPCS: Performed by: INTERNAL MEDICINE

## 2025-03-27 PROCEDURE — 2500000003 HC RX 250 WO HCPCS: Performed by: INTERNAL MEDICINE

## 2025-03-27 PROCEDURE — 36415 COLL VENOUS BLD VENIPUNCTURE: CPT

## 2025-03-27 PROCEDURE — C1894 INTRO/SHEATH, NON-LASER: HCPCS | Performed by: INTERNAL MEDICINE

## 2025-03-27 PROCEDURE — 7100000011 HC PHASE II RECOVERY - ADDTL 15 MIN: Performed by: INTERNAL MEDICINE

## 2025-03-27 PROCEDURE — 92928 PRQ TCAT PLMT NTRAC ST 1 LES: CPT | Performed by: INTERNAL MEDICINE

## 2025-03-27 PROCEDURE — 6370000000 HC RX 637 (ALT 250 FOR IP): Performed by: INTERNAL MEDICINE

## 2025-03-27 PROCEDURE — C1725 CATH, TRANSLUMIN NON-LASER: HCPCS | Performed by: INTERNAL MEDICINE

## 2025-03-27 PROCEDURE — C1874 STENT, COATED/COV W/DEL SYS: HCPCS | Performed by: INTERNAL MEDICINE

## 2025-03-27 PROCEDURE — B211YZZ FLUOROSCOPY OF MULTIPLE CORONARY ARTERIES USING OTHER CONTRAST: ICD-10-PCS | Performed by: INTERNAL MEDICINE

## 2025-03-27 PROCEDURE — C1887 CATHETER, GUIDING: HCPCS | Performed by: INTERNAL MEDICINE

## 2025-03-27 PROCEDURE — 33210 INSERT ELECTRD/PM CATH SNGL: CPT | Performed by: INTERNAL MEDICINE

## 2025-03-27 DEVICE — FLOW DIRECTED PACING CATHETER
Type: IMPLANTABLE DEVICE | Status: FUNCTIONAL
Brand: PACEL™

## 2025-03-27 DEVICE — STENT ONYXNG25018UX ONYX 2.50X18RX
Type: IMPLANTABLE DEVICE | Status: FUNCTIONAL
Brand: ONYX FRONTIER™

## 2025-03-27 RX ORDER — CYCLOBENZAPRINE HCL 10 MG
10 TABLET ORAL 3 TIMES DAILY PRN
Status: DISCONTINUED | OUTPATIENT
Start: 2025-03-27 | End: 2025-03-28 | Stop reason: HOSPADM

## 2025-03-27 RX ORDER — IOPAMIDOL 755 MG/ML
INJECTION, SOLUTION INTRAVASCULAR PRN
Status: DISCONTINUED | OUTPATIENT
Start: 2025-03-27 | End: 2025-03-27 | Stop reason: HOSPADM

## 2025-03-27 RX ORDER — PANTOPRAZOLE SODIUM 40 MG/1
40 TABLET, DELAYED RELEASE ORAL
Status: DISCONTINUED | OUTPATIENT
Start: 2025-03-28 | End: 2025-03-28 | Stop reason: HOSPADM

## 2025-03-27 RX ORDER — ALLOPURINOL 100 MG/1
100 TABLET ORAL DAILY
Status: DISCONTINUED | OUTPATIENT
Start: 2025-03-28 | End: 2025-03-28 | Stop reason: HOSPADM

## 2025-03-27 RX ORDER — ATORVASTATIN CALCIUM 40 MG/1
40 TABLET, FILM COATED ORAL NIGHTLY
Status: DISCONTINUED | OUTPATIENT
Start: 2025-03-27 | End: 2025-03-28 | Stop reason: HOSPADM

## 2025-03-27 RX ORDER — SODIUM CHLORIDE 0.9 % (FLUSH) 0.9 %
5-40 SYRINGE (ML) INJECTION PRN
Status: DISCONTINUED | OUTPATIENT
Start: 2025-03-27 | End: 2025-03-27 | Stop reason: HOSPADM

## 2025-03-27 RX ORDER — ONDANSETRON 2 MG/ML
4 INJECTION INTRAMUSCULAR; INTRAVENOUS EVERY 6 HOURS PRN
Status: DISCONTINUED | OUTPATIENT
Start: 2025-03-27 | End: 2025-03-27 | Stop reason: HOSPADM

## 2025-03-27 RX ORDER — SODIUM CHLORIDE 0.9 % (FLUSH) 0.9 %
5-40 SYRINGE (ML) INJECTION EVERY 12 HOURS SCHEDULED
Status: DISCONTINUED | OUTPATIENT
Start: 2025-03-27 | End: 2025-03-27 | Stop reason: HOSPADM

## 2025-03-27 RX ORDER — COLCHICINE 0.6 MG/1
0.6 TABLET ORAL DAILY PRN
Status: DISCONTINUED | OUTPATIENT
Start: 2025-03-27 | End: 2025-03-28 | Stop reason: HOSPADM

## 2025-03-27 RX ORDER — ACETAMINOPHEN 325 MG/1
650 TABLET ORAL EVERY 4 HOURS PRN
Status: DISCONTINUED | OUTPATIENT
Start: 2025-03-27 | End: 2025-03-28 | Stop reason: HOSPADM

## 2025-03-27 RX ORDER — NITROGLYCERIN 0.4 MG/1
0.4 TABLET SUBLINGUAL EVERY 5 MIN PRN
Status: DISCONTINUED | OUTPATIENT
Start: 2025-03-27 | End: 2025-03-27 | Stop reason: HOSPADM

## 2025-03-27 RX ORDER — MULTIVIT WITH MINERALS/LUTEIN
250 TABLET ORAL DAILY
Status: DISCONTINUED | OUTPATIENT
Start: 2025-03-27 | End: 2025-03-28 | Stop reason: HOSPADM

## 2025-03-27 RX ORDER — CLOPIDOGREL 300 MG/1
TABLET, FILM COATED ORAL PRN
Status: DISCONTINUED | OUTPATIENT
Start: 2025-03-27 | End: 2025-03-27 | Stop reason: HOSPADM

## 2025-03-27 RX ORDER — LOSARTAN POTASSIUM 25 MG/1
25 TABLET ORAL DAILY
Status: DISCONTINUED | OUTPATIENT
Start: 2025-03-28 | End: 2025-03-28 | Stop reason: HOSPADM

## 2025-03-27 RX ORDER — HYDROCODONE BITARTRATE AND ACETAMINOPHEN 5; 325 MG/1; MG/1
2 TABLET ORAL EVERY 4 HOURS PRN
Status: DISCONTINUED | OUTPATIENT
Start: 2025-03-27 | End: 2025-03-28 | Stop reason: HOSPADM

## 2025-03-27 RX ORDER — HEPARIN SODIUM 1000 [USP'U]/ML
INJECTION, SOLUTION INTRAVENOUS; SUBCUTANEOUS PRN
Status: DISCONTINUED | OUTPATIENT
Start: 2025-03-27 | End: 2025-03-27 | Stop reason: HOSPADM

## 2025-03-27 RX ORDER — HYDROCODONE BITARTRATE AND ACETAMINOPHEN 5; 325 MG/1; MG/1
1 TABLET ORAL EVERY 4 HOURS PRN
Status: DISCONTINUED | OUTPATIENT
Start: 2025-03-27 | End: 2025-03-28 | Stop reason: HOSPADM

## 2025-03-27 RX ORDER — VERAPAMIL HYDROCHLORIDE 2.5 MG/ML
INJECTION, SOLUTION INTRAVENOUS PRN
Status: DISCONTINUED | OUTPATIENT
Start: 2025-03-27 | End: 2025-03-27 | Stop reason: HOSPADM

## 2025-03-27 RX ORDER — SODIUM CHLORIDE 9 MG/ML
INJECTION, SOLUTION INTRAVENOUS PRN
Status: DISCONTINUED | OUTPATIENT
Start: 2025-03-27 | End: 2025-03-27 | Stop reason: HOSPADM

## 2025-03-27 RX ORDER — METOPROLOL TARTRATE 50 MG
25 TABLET ORAL 2 TIMES DAILY
Status: DISCONTINUED | OUTPATIENT
Start: 2025-03-27 | End: 2025-03-28

## 2025-03-27 RX ORDER — PHENYLEPHRINE HYDROCHLORIDE 10 MG/ML
INJECTION INTRAVENOUS PRN
Status: DISCONTINUED | OUTPATIENT
Start: 2025-03-27 | End: 2025-03-27 | Stop reason: HOSPADM

## 2025-03-27 RX ORDER — ASPIRIN 81 MG/1
81 TABLET ORAL DAILY
Status: DISCONTINUED | OUTPATIENT
Start: 2025-03-28 | End: 2025-03-28 | Stop reason: HOSPADM

## 2025-03-27 RX ORDER — CLOPIDOGREL BISULFATE 75 MG/1
75 TABLET ORAL DAILY
Status: DISCONTINUED | OUTPATIENT
Start: 2025-03-28 | End: 2025-03-28 | Stop reason: HOSPADM

## 2025-03-27 RX ORDER — ATROPINE SULFATE 1 MG/ML
INJECTION, SOLUTION INTRAMUSCULAR; INTRAVENOUS; SUBCUTANEOUS PRN
Status: DISCONTINUED | OUTPATIENT
Start: 2025-03-27 | End: 2025-03-27 | Stop reason: HOSPADM

## 2025-03-27 RX ORDER — CHLORAL HYDRATE 500 MG
1 CAPSULE ORAL DAILY
Status: DISCONTINUED | OUTPATIENT
Start: 2025-03-27 | End: 2025-03-27 | Stop reason: CLARIF

## 2025-03-27 RX ORDER — ASPIRIN 325 MG
325 TABLET ORAL ONCE
Status: COMPLETED | OUTPATIENT
Start: 2025-03-27 | End: 2025-03-27

## 2025-03-27 RX ORDER — SODIUM CHLORIDE 9 MG/ML
INJECTION, SOLUTION INTRAVENOUS CONTINUOUS
Status: DISCONTINUED | OUTPATIENT
Start: 2025-03-27 | End: 2025-03-27 | Stop reason: HOSPADM

## 2025-03-27 RX ADMIN — SODIUM CHLORIDE: 0.9 INJECTION, SOLUTION INTRAVENOUS at 07:40

## 2025-03-27 RX ADMIN — CYCLOBENZAPRINE 10 MG: 10 TABLET, FILM COATED ORAL at 19:48

## 2025-03-27 RX ADMIN — ASPIRIN 325 MG: 325 TABLET ORAL at 07:47

## 2025-03-27 RX ADMIN — ATORVASTATIN CALCIUM 40 MG: 40 TABLET, FILM COATED ORAL at 22:56

## 2025-03-27 RX ADMIN — HYDROCODONE BITARTRATE AND ACETAMINOPHEN 2 TABLET: 5; 325 TABLET ORAL at 14:03

## 2025-03-27 ASSESSMENT — PAIN DESCRIPTION - LOCATION
LOCATION: BACK
LOCATION: BACK

## 2025-03-27 ASSESSMENT — PAIN DESCRIPTION - DESCRIPTORS: DESCRIPTORS: THROBBING

## 2025-03-27 ASSESSMENT — ENCOUNTER SYMPTOMS
SHORTNESS OF BREATH: 1
CHEST TIGHTNESS: 1

## 2025-03-27 ASSESSMENT — PAIN DESCRIPTION - ORIENTATION
ORIENTATION: MID
ORIENTATION: LOWER

## 2025-03-27 ASSESSMENT — PAIN - FUNCTIONAL ASSESSMENT: PAIN_FUNCTIONAL_ASSESSMENT: ACTIVITIES ARE NOT PREVENTED

## 2025-03-27 ASSESSMENT — PAIN SCALES - GENERAL
PAINLEVEL_OUTOF10: 8
PAINLEVEL_OUTOF10: 6

## 2025-03-27 NOTE — PROGRESS NOTES
Dr. Barriga at bedside. Pacemaker settings adjusted, see flowsheet.    Electronically signed by Janene Gaitan RN on 3/27/2025 at 2:27 PM

## 2025-03-27 NOTE — PROGRESS NOTES
PHARMACY NOTE  Jefffernando Gilmorevelvet was ordered Omega-3 CAPS 1,000 mg. Per the [facility] Formulary Committee, this medication is non-formulary and not stocked by pharmacy.  It has been discontinued. The medication can be reordered at discharge.     Electronically signed by Rina Brooks [facility] on 3/27/2025 at 11:30 AM

## 2025-03-27 NOTE — H&P
[facility]  Cardiology H&P      Requesting MD:  Amy Barriga MD   Admit Status:         History obtained from:   [x] Patient  [] Other (specify):     Patient:  Jeff Zamudio  [id number]     Chief Complaint: No chief complaint on file.    Complete heart block during cardiac catheterization    HPI: Mr. Zamudio is a 79 y.o. male with a history of permanent atrial fibrillation on beta-blocker and calcium channel blocker, suspected paradoxical low-flow low gradient moderate to severe aortic stenosis who is admitted to the ICU after requiring transvenous pacemaker due to being completely pacemaker dependent while undergoing cardiac catheterization.  He was undergoing left and right heart catheterization today as part of his aortic stenosis workup.  A right heart catheterization could not be performed as he required an emergent pacemaker placement via the femoral venous line.    While the patient is diagnostic cardiac catheterization was being performed, he went into complete heart block with heart rate in the 30s requiring an amp of atropine followed by an emergent placement of a temporary venous pacemaker.  He did not recover from his complete heart block thus we placed a temporary venous pacemaker with a set rate of 60 bpm.    His cardiac catheterization demonstrated severe mid circumflex lesion which underwent successful PCI with no complications.    He was loaded with Plavix 300 mg post PCI.    He will be admitted to the ICU.  Will wait for beta-blocker and Cardizem washout in order to assess whether his complete heart block is predominantly due to to AV erica blockers.  If his complete heart block does not improve then we will consider permanent pacemaker.    Review of Systems:  Review of Systems   Constitutional:  Negative for diaphoresis.   Respiratory:  Positive for chest tightness and shortness of breath.    Cardiovascular:  Negative for palpitations and leg swelling.

## 2025-03-27 NOTE — PROGRESS NOTES
Patient arrived from cath lab to [facility]. Cath sites clean, dry, and intact. Pacemaker in place and settings verified with cath lab nurse. Vital signs stable. Patient alert and oriented x4.    Electronically signed by Janene Gaitan RN on 3/27/2025 at 11:10 AM

## 2025-03-27 NOTE — PROGRESS NOTES
4 Eyes Skin Assessment     NAME:  Jeff Zamudio  YOB: 1945  MEDICAL RECORD NUMBER:  [id number]    The patient is being assessed for  Cath Lab Post-Op    I agree that at least one RN has performed a thorough Head to Toe Skin Assessment on the patient. ALL assessment sites listed below have been assessed.      Areas assessed by both nurses:    Head, Face, Ears, Shoulders, Back, Chest, Arms, Elbows, Hands, Sacrum. Buttock, Coccyx, Ischium, Legs. Feet and Heels, and Under Medical Devices         Does the Patient have a Wound? No noted wound(s)       Miguel Prevention initiated by RN: No  Wound Care Orders initiated by RN: No    Pressure Injury (Stage 3,4, Unstageable, DTI, NWPT, and Complex wounds) if present, place Wound referral order by RN under : No    New Ostomies, if present place, Ostomy referral order under : No     Nurse 1 eSignature: Electronically signed by Janene Gaitan RN on 3/27/25 at 1:52 PM CDT    **SHARE this note so that the co-signing nurse can place an eSignature**    Nurse 2 eSignature: {Esignature:[id number]}

## 2025-03-27 NOTE — PROGRESS NOTES
Patient transported to  on cardiac monitor with 2 cath lab RNs. Right wrist/temporary pacemaker site c/d/I upon transfer.  Electronically signed by Ina Patel RN on 3/27/2025 at 10:58 AM

## 2025-03-28 ENCOUNTER — APPOINTMENT (OUTPATIENT)
Age: 80
End: 2025-03-28
Attending: INTERNAL MEDICINE
Payer: MEDICARE

## 2025-03-28 VITALS
TEMPERATURE: 97.6 F | OXYGEN SATURATION: 92 % | DIASTOLIC BLOOD PRESSURE: 62 MMHG | HEART RATE: 108 BPM | BODY MASS INDEX: 27.63 KG/M2 | RESPIRATION RATE: 23 BRPM | HEIGHT: 70 IN | SYSTOLIC BLOOD PRESSURE: 130 MMHG | WEIGHT: 193 LBS

## 2025-03-28 LAB
ANION GAP SERPL CALCULATED.3IONS-SCNC: 10 MMOL/L (ref 8–16)
BUN SERPL-MCNC: 13 MG/DL (ref 8–23)
CALCIUM SERPL-MCNC: 9 MG/DL (ref 8.8–10.2)
CHLORIDE SERPL-SCNC: 104 MMOL/L (ref 98–107)
CO2 SERPL-SCNC: 23 MMOL/L (ref 22–29)
CREAT SERPL-MCNC: 1.2 MG/DL (ref 0.7–1.2)
ECHO BSA: 2.08 M2
ERYTHROCYTE [DISTWIDTH] IN BLOOD BY AUTOMATED COUNT: 15.2 % (ref 11.5–14.5)
GLUCOSE SERPL-MCNC: 109 MG/DL (ref 70–99)
HCT VFR BLD AUTO: 42 % (ref 42–52)
HGB BLD-MCNC: 13.6 G/DL (ref 14–18)
MCH RBC QN AUTO: 29.2 PG (ref 27–31)
MCHC RBC AUTO-ENTMCNC: 32.4 G/DL (ref 33–37)
MCV RBC AUTO: 90.3 FL (ref 80–94)
PLATELET # BLD AUTO: 157 K/UL (ref 130–400)
PMV BLD AUTO: 11.8 FL (ref 9.4–12.4)
POTASSIUM SERPL-SCNC: 3.9 MMOL/L (ref 3.5–5.1)
RBC # BLD AUTO: 4.65 M/UL (ref 4.7–6.1)
SODIUM SERPL-SCNC: 137 MMOL/L (ref 136–145)
WBC # BLD AUTO: 12.1 K/UL (ref 4.8–10.8)

## 2025-03-28 PROCEDURE — 94760 N-INVAS EAR/PLS OXIMETRY 1: CPT

## 2025-03-28 PROCEDURE — 2580000003 HC RX 258: Performed by: INTERNAL MEDICINE

## 2025-03-28 PROCEDURE — 36415 COLL VENOUS BLD VENIPUNCTURE: CPT

## 2025-03-28 PROCEDURE — 6370000000 HC RX 637 (ALT 250 FOR IP): Performed by: INTERNAL MEDICINE

## 2025-03-28 PROCEDURE — 85027 COMPLETE CBC AUTOMATED: CPT

## 2025-03-28 PROCEDURE — 93246 EXT ECG>7D<15D RECORDING: CPT

## 2025-03-28 PROCEDURE — 99232 SBSQ HOSP IP/OBS MODERATE 35: CPT | Performed by: INTERNAL MEDICINE

## 2025-03-28 PROCEDURE — 6360000002 HC RX W HCPCS: Performed by: INTERNAL MEDICINE

## 2025-03-28 PROCEDURE — 80048 BASIC METABOLIC PNL TOTAL CA: CPT

## 2025-03-28 RX ORDER — CLOPIDOGREL BISULFATE 75 MG/1
75 TABLET ORAL DAILY
Qty: 90 TABLET | Refills: 1 | Status: SHIPPED | OUTPATIENT
Start: 2025-03-29

## 2025-03-28 RX ORDER — AMIODARONE HYDROCHLORIDE 200 MG/1
200 TABLET ORAL 2 TIMES DAILY
Qty: 180 TABLET | Refills: 1 | Status: SHIPPED | OUTPATIENT
Start: 2025-03-28

## 2025-03-28 RX ORDER — METOPROLOL TARTRATE 50 MG
25 TABLET ORAL 2 TIMES DAILY
Status: DISCONTINUED | OUTPATIENT
Start: 2025-03-28 | End: 2025-03-28

## 2025-03-28 RX ORDER — AMIODARONE HYDROCHLORIDE 100 MG/1
200 TABLET ORAL 2 TIMES DAILY
Status: DISCONTINUED | OUTPATIENT
Start: 2025-03-28 | End: 2025-03-28 | Stop reason: HOSPADM

## 2025-03-28 RX ORDER — ATORVASTATIN CALCIUM 40 MG/1
40 TABLET, FILM COATED ORAL NIGHTLY
Qty: 30 TABLET | Refills: 3 | Status: SHIPPED | OUTPATIENT
Start: 2025-03-28

## 2025-03-28 RX ORDER — DILTIAZEM HYDROCHLORIDE 240 MG/1
240 CAPSULE, COATED, EXTENDED RELEASE ORAL DAILY
Status: DISCONTINUED | OUTPATIENT
Start: 2025-03-28 | End: 2025-03-28 | Stop reason: HOSPADM

## 2025-03-28 RX ADMIN — AMIODARONE HYDROCHLORIDE 300 MG: 50 INJECTION, SOLUTION INTRAVENOUS at 13:08

## 2025-03-28 RX ADMIN — DILTIAZEM HYDROCHLORIDE 240 MG: 120 CAPSULE, COATED, EXTENDED RELEASE ORAL at 09:10

## 2025-03-28 RX ADMIN — AMIODARONE HYDROCHLORIDE 200 MG: 200 TABLET ORAL at 12:40

## 2025-03-28 RX ADMIN — METOPROLOL TARTRATE 25 MG: 50 TABLET, FILM COATED ORAL at 11:18

## 2025-03-28 RX ADMIN — CLOPIDOGREL BISULFATE 75 MG: 75 TABLET, FILM COATED ORAL at 07:52

## 2025-03-28 RX ADMIN — HYDROCODONE BITARTRATE AND ACETAMINOPHEN 2 TABLET: 5; 325 TABLET ORAL at 01:43

## 2025-03-28 RX ADMIN — PANTOPRAZOLE SODIUM 40 MG: 40 TABLET, DELAYED RELEASE ORAL at 06:10

## 2025-03-28 RX ADMIN — LOSARTAN POTASSIUM 25 MG: 25 TABLET, FILM COATED ORAL at 08:30

## 2025-03-28 RX ADMIN — ASPIRIN 81 MG: 81 TABLET, COATED ORAL at 07:52

## 2025-03-28 RX ADMIN — Medication 250 MG: at 07:52

## 2025-03-28 RX ADMIN — ALLOPURINOL 100 MG: 100 TABLET ORAL at 07:52

## 2025-03-28 ASSESSMENT — PAIN DESCRIPTION - LOCATION: LOCATION: BACK

## 2025-03-28 ASSESSMENT — PAIN DESCRIPTION - DESCRIPTORS: DESCRIPTORS: TIGHTNESS;THROBBING

## 2025-03-28 ASSESSMENT — PAIN SCALES - WONG BAKER: WONGBAKER_NUMERICALRESPONSE: HURTS A LITTLE BIT

## 2025-03-28 ASSESSMENT — PAIN SCALES - GENERAL
PAINLEVEL_OUTOF10: 7
PAINLEVEL_OUTOF10: 3

## 2025-03-28 ASSESSMENT — PAIN DESCRIPTION - ORIENTATION: ORIENTATION: MID

## 2025-03-28 NOTE — PROGRESS NOTES
Pt ambulated 2 laps around ICU, tolerated well. Pt HR resting 120-130s, HR while ambulating 150-160s.     Electronically signed by Carmenza Ramirez RN on 3/28/2025 at 11:04 AM

## 2025-03-28 NOTE — PROGRESS NOTES
Second cardiology note    Patient seen resting comfortably on recliner.  Telemetry at bedside demonstrates heart rates in the 140s to 150s with A-fib.  He has no cardiac complaints.    We will give him a bolus of amnio 300 mg followed by amnio 200 mg p.o. twice daily with a dose now  Discontinue low-dose Lopressor    If his resting heart rate averages around 100 210 then we will plan for discharge today.  If his resting heart rate still remains elevated and he remains in A-fib with RVR then we will have to continue to optimize his AV erica blockers prior to discharge.    Prior to discharge, we will also plan for 1 week Holter monitor.  At discharge, he will need a 1 week follow-up with me.  Since he has had circumflex PCI, I discharged he will continue with his home Xarelto along with Plavix 75 mg daily.  No need for additional aspirin.    Discussed with RN

## 2025-03-28 NOTE — PROGRESS NOTES
No further pacemaker dependency for the last 12+ hours  Pacemaker removed at bedside  Venous sheath removed    The patient's telemetry shows A-fib with heart rates in the 120s  Will give his home Cardizem 240 mg now  Hold his beta-blocker    Bedrest for about an hour  Afterwards, we will ambulate and reassess his rhythm.  If no significant tachycardia and no significant AV block then will discharge home.    Discussed with RN.

## 2025-03-28 NOTE — PROGRESS NOTES
03/28/25 1346   Encounter Summary   Encounter Overview/Reason Spiritual/Emotional Needs   Encounter Code  Counseling, Individual, by  services   Service Provided For Patient   Referral/Consult From Rounding   Support System Family members;Jew/ana laura community   Complexity of Encounter Low   Begin Time 1110   End Time  1130   Total Time Calculated 20 min   Crisis   Type Family Care   Spiritual/Emotional needs   Type Spiritual Support   Grief, Loss, and Adjustments   Type Adjustment to illness   Assessment/Intervention/Outcome   Assessment Calm;Hopeful;Loneliness;Social isolation  (The patient mentioned that he worked in different contexts, as a , he served our country, he has two daughters and a son who was a  in the [place].  He seems to be ok with his emotional health and his spiritual life continues as usual)   Intervention Sustaining Presence/Ministry of presence;Nurtured Hope;Empowerment   Outcome Expressed Gratitude   Plan and Referrals   Plan/Referrals No future visits requested   Does the patient have a [facility] PCP? Yes    to follow up after discharge? No     Electronically signed by Ines Hernandez. on 3/28/2025 at 1:52 PM

## 2025-03-28 NOTE — DISCHARGE SUMMARY
Discharge Summary    Jeff Zamudio  :  1945  MRN:  [id number]    Admit date:  3/27/2025  Discharge date:      Admitting Physician:  Amy Barriga MD    Advance Directive: Full Code    Consults: none    Primary Care Physician:  Wade Martin    Discharge Diagnoses:  Principal Problem:    Severe aortic stenosis  Active Problems:    S/P cardiac cath  Resolved Problems:    * No resolved hospital problems. *      Problem List:   Patient Active Problem List    Diagnosis Date Noted    S/P cardiac cath 2025     Priority: High    Severe aortic stenosis 2025     Priority: Low    Permanent atrial fibrillation (HCC) 2021     Priority: Low     Overview Note:     Rate controlled on metoprolol, diltiazem and Eliquis      Mild aortic valve stenosis 2021     Priority: Low     Overview Note:     Noted by echo, 2021      Essential hypertension 2021     Priority: Low    Cholangitis (HCC)      Priority: Low    Common bile duct (CBD) obstruction (HCC) 2021     Priority: Low       Cardiology Specific Data:  Specialty Problems          Cardiology Problems    Essential hypertension        Mild aortic valve stenosis        Permanent atrial fibrillation (HCC)        * (Principal) Severe aortic stenosis           Significant Diagnostic Studies:   Cardiac procedure  Addendum Date: 3/27/2025  1.  Critical, 90% mid circumflex type A lesion. I suspect this was the contributing to his dyspnea on exertion (angina equivalent. He is now status post PCI with 2.5 x 18 mm MARIELA. 2.  Loaded with Plavix 300 mg in the Cath Lab.  Continue with Plavix 75 mg daily starting tomorrow. 3.  Continue with aspirin 81 mg daily for now.  Once a decision has been made regarding the need for a pacemaker, we will decide on resuming his home Xarelto. 4.  Invasive aortic valve hemodynamic study VS simultaneous gradient obtained via the Shaheed catheter suggest likely moderate aortic stenosis.  However, in the

## 2025-03-28 NOTE — PROGRESS NOTES
Cardiology Daily Note Amy Barriga MD      Patient:  Jeff Zamudio  [id number]    Patient Active Problem List    Diagnosis Date Noted    S/P cardiac cath 03/27/2025    Severe aortic stenosis 03/17/2025    Permanent atrial fibrillation (HCC) 11/11/2021    Mild aortic valve stenosis 11/11/2021    Essential hypertension 11/11/2021    Cholangitis (HCC)     Common bile duct (CBD) obstruction (HCC) 09/01/2021       Admit Date:  3/27/2025    Admission Problem List: Present on Admission:   S/P cardiac cath      Cardiac Specific Data:  Specialty Problems          Cardiology Problems    Essential hypertension        Mild aortic valve stenosis        Permanent atrial fibrillation (HCC)        * (Principal) Severe aortic stenosis           Subjective:  Mr. Zamudio feels well this morning.  No further pacemaker dependency.  Telemetry demonstrates heart rates in the 90s to 120s.  Denies any palpitations or lightheadedness.    Transvenous pacemaker along with 6 Syriac venous sheath was removed at bedside by myself.      Assessment:  Intermittent complete heart block with underlying A-fib, suspect sinus node dysfunction -no further pacemaker dependency   CAD with severe mid circumflex stenosis -status post PCI with 2.5 x 18 mm MARIELA.  Suspected paradoxical low-flow low gradient moderate to severe aortic stenosis -aortic valve hemodynamic study does not suggest severe aortic stenosis but will have echo reviewed by imaging specialist      Plan:  TVP removed at bedside by myself along with 6 Syriac venous sheath.  Will plan for this 1 hour of bedrest followed by up in chair and then ambulation.  Also was given Cardizem 240 mg given his heart rate in the 120s.  If he has no further tachycardia or AV block then we will plan for discharge later on today  As he is on Xarelto at home, will plan for Plavix and Xarelto at discharge  Will plan for 2-week Holter monitor at discharge and then follow-up in clinic after.    Objective:   BP

## 2025-03-28 NOTE — DISCHARGE INSTRUCTIONS
Call Dr Barriga's office for follow up visit on Monday!!      Your Cardiologist has referred you for participation in Cardiac Rehabilitation and a consult has been sent to the [facility] Cardiac Rehab program.     You are to contact [facility] Cardiac & Pulmonary Rehab WITHIN ONE WEEK AFTER HOSPITAL DISCHARGE to schedule your \"orientation & assessment\" appointment by calling direct at [phone].

## 2025-03-30 LAB
EKG P AXIS: NORMAL DEGREES
EKG P-R INTERVAL: NORMAL MS
EKG Q-T INTERVAL: 402 MS
EKG QRS DURATION: 110 MS
EKG QTC CALCULATION (BAZETT): 456 MS
EKG T AXIS: 66 DEGREES

## 2025-04-01 ENCOUNTER — TELEPHONE (OUTPATIENT)
Dept: CARDIAC REHAB | Age: 80
End: 2025-04-01

## 2025-04-01 DIAGNOSIS — I25.10 CORONARY ARTERY DISEASE INVOLVING NATIVE CORONARY ARTERY OF NATIVE HEART WITHOUT ANGINA PECTORIS: Primary | ICD-10-CM

## 2025-04-01 NOTE — TELEPHONE ENCOUNTER
Sidney-I have placed an external referral for cardiac rehab.  If you could please send this to patient's desired location.  Thank you

## 2025-04-01 NOTE — TELEPHONE ENCOUNTER
Mr. Zamudio had a Cardiac Rehab Orientation & Assessment scheduled for this afternoon.  Upon reviewing his record it was noted he lives in [place], [place].  I contacted him concerned for the distance from our facility and asked if he would like us to send an order/referral for a Cardiac Rehab Program closer to his residence.  He was very agreeable to this.  I gave him the telephone number to [facility] in [place], [place].  I also contacted that facility.  They will require an order from our end.

## 2025-04-02 DIAGNOSIS — I35.0 SEVERE AORTIC STENOSIS: Primary | ICD-10-CM

## 2025-04-03 ENCOUNTER — TELEPHONE (OUTPATIENT)
Dept: CARDIOLOGY CLINIC | Age: 80
End: 2025-04-03

## 2025-04-04 ENCOUNTER — HOSPITAL ENCOUNTER (OUTPATIENT)
Dept: PULMONOLOGY | Age: 80
Discharge: HOME | End: 2025-04-04
Attending: INTERNAL MEDICINE

## 2025-04-04 DIAGNOSIS — I35.0 SEVERE AORTIC STENOSIS: ICD-10-CM

## 2025-04-04 RX ORDER — ALBUTEROL SULFATE 0.83 MG/ML
2.5 SOLUTION RESPIRATORY (INHALATION) 2 TIMES DAILY PRN
Status: DISCONTINUED | OUTPATIENT
Start: 2025-04-04 | End: 2025-04-06 | Stop reason: HOSPADM

## 2025-04-16 LAB — ECHO BSA: 2.08 M2

## 2025-04-17 DIAGNOSIS — I35.0 SEVERE AORTIC STENOSIS: Primary | ICD-10-CM

## 2025-04-21 ENCOUNTER — HOSPITAL ENCOUNTER (OUTPATIENT)
Dept: CT IMAGING | Age: 80
Discharge: HOME | End: 2025-04-21
Attending: INTERNAL MEDICINE
Payer: MEDICARE

## 2025-04-21 DIAGNOSIS — I35.0 SEVERE AORTIC STENOSIS: ICD-10-CM

## 2025-04-21 PROCEDURE — 6360000004 HC RX CONTRAST MEDICATION: Performed by: INTERNAL MEDICINE

## 2025-04-21 PROCEDURE — 75572 CT HRT W/3D IMAGE: CPT

## 2025-04-21 RX ORDER — IOPAMIDOL 755 MG/ML
70 INJECTION, SOLUTION INTRAVASCULAR
Status: COMPLETED | OUTPATIENT
Start: 2025-04-21 | End: 2025-04-21

## 2025-04-21 RX ADMIN — IOPAMIDOL 70 ML: 755 INJECTION, SOLUTION INTRAVENOUS at 13:22

## 2025-04-22 ENCOUNTER — TELEPHONE (OUTPATIENT)
Dept: CARDIOLOGY CLINIC | Age: 80
End: 2025-04-22

## 2025-04-22 DIAGNOSIS — I44.2 COMPLETE HEART BLOCK (HCC): Primary | ICD-10-CM

## 2025-04-22 DIAGNOSIS — Z01.818 PRE-OP TESTING: Primary | ICD-10-CM

## 2025-04-22 NOTE — TELEPHONE ENCOUNTER
[place] at the [facility] [place] ([facility]) located on the first floor of [facility]. Enter through hospital main entrance and turn immediately to your left.     Procedure Date: 04/23/25   Procedure Arrival Time: 12 pm  Procedure Start Time: 2 pm  (Times are subject to change)     Please have your lab work (CBC and BMP) done before your procedure Date.     Procedure Instructions:   1) You will need to not have anything to eat or drink the morning before the procedure.   2) You will stay overnight for observation.  3) You can still take all of your morning medication with a sip of water   4) Do not take Xarelto the night before your procedure. Dr. Hassan okvasyl'd patient only holding blood thinner one night.   5) You will need a  for the procedure.     Went over the above instructions with patient and he voiced understanding.

## 2025-04-23 ENCOUNTER — HOSPITAL ENCOUNTER (OUTPATIENT)
Age: 80
Setting detail: OBSERVATION
Discharge: HOME | End: 2025-04-24
Attending: INTERNAL MEDICINE | Admitting: INTERNAL MEDICINE
Payer: MEDICARE

## 2025-04-23 DIAGNOSIS — I44.2 COMPLETE HEART BLOCK (HCC): ICD-10-CM

## 2025-04-23 LAB — ECHO BSA: 2.12 M2

## 2025-04-23 PROCEDURE — 6360000002 HC RX W HCPCS: Performed by: INTERNAL MEDICINE

## 2025-04-23 PROCEDURE — 6360000004 HC RX CONTRAST MEDICATION: Performed by: INTERNAL MEDICINE

## 2025-04-23 PROCEDURE — G0378 HOSPITAL OBSERVATION PER HR: HCPCS

## 2025-04-23 PROCEDURE — 33274 TCAT INSJ/RPL PERM LDLS PM: CPT | Performed by: INTERNAL MEDICINE

## 2025-04-23 PROCEDURE — C1786 PMKR, SINGLE, RATE-RESP: HCPCS | Performed by: INTERNAL MEDICINE

## 2025-04-23 PROCEDURE — 99222 1ST HOSP IP/OBS MODERATE 55: CPT | Performed by: INTERNAL MEDICINE

## 2025-04-23 PROCEDURE — 2709999900 HC NON-CHARGEABLE SUPPLY: Performed by: INTERNAL MEDICINE

## 2025-04-23 PROCEDURE — 2500000003 HC RX 250 WO HCPCS: Performed by: INTERNAL MEDICINE

## 2025-04-23 PROCEDURE — 99152 MOD SED SAME PHYS/QHP 5/>YRS: CPT | Performed by: INTERNAL MEDICINE

## 2025-04-23 PROCEDURE — 6370000000 HC RX 637 (ALT 250 FOR IP): Performed by: INTERNAL MEDICINE

## 2025-04-23 PROCEDURE — C1769 GUIDE WIRE: HCPCS | Performed by: INTERNAL MEDICINE

## 2025-04-23 PROCEDURE — 2580000003 HC RX 258: Performed by: INTERNAL MEDICINE

## 2025-04-23 PROCEDURE — C1894 INTRO/SHEATH, NON-LASER: HCPCS | Performed by: INTERNAL MEDICINE

## 2025-04-23 DEVICE — TPS MC2VR01 MICRA VR2 US
Type: IMPLANTABLE DEVICE | Status: FUNCTIONAL
Brand: MICRA™ VR2

## 2025-04-23 RX ORDER — PANTOPRAZOLE SODIUM 40 MG/1
40 TABLET, DELAYED RELEASE ORAL
Status: DISCONTINUED | OUTPATIENT
Start: 2025-04-24 | End: 2025-04-24 | Stop reason: HOSPADM

## 2025-04-23 RX ORDER — LOSARTAN POTASSIUM 50 MG/1
25 TABLET ORAL DAILY
Status: DISCONTINUED | OUTPATIENT
Start: 2025-04-24 | End: 2025-04-24 | Stop reason: HOSPADM

## 2025-04-23 RX ORDER — SODIUM CHLORIDE 0.9 % (FLUSH) 0.9 %
5-40 SYRINGE (ML) INJECTION EVERY 12 HOURS SCHEDULED
Status: DISCONTINUED | OUTPATIENT
Start: 2025-04-23 | End: 2025-04-23 | Stop reason: HOSPADM

## 2025-04-23 RX ORDER — CHLORHEXIDINE GLUCONATE 40 MG/ML
SOLUTION TOPICAL ONCE
Status: DISCONTINUED | OUTPATIENT
Start: 2025-04-23 | End: 2025-04-23 | Stop reason: HOSPADM

## 2025-04-23 RX ORDER — MIDAZOLAM HYDROCHLORIDE 1 MG/ML
INJECTION, SOLUTION INTRAMUSCULAR; INTRAVENOUS PRN
Status: DISCONTINUED | OUTPATIENT
Start: 2025-04-23 | End: 2025-04-23 | Stop reason: HOSPADM

## 2025-04-23 RX ORDER — IOPAMIDOL 612 MG/ML
INJECTION, SOLUTION INTRAVASCULAR PRN
Status: DISCONTINUED | OUTPATIENT
Start: 2025-04-23 | End: 2025-04-23 | Stop reason: HOSPADM

## 2025-04-23 RX ORDER — SODIUM CHLORIDE 9 MG/ML
INJECTION, SOLUTION INTRAVENOUS PRN
Status: DISCONTINUED | OUTPATIENT
Start: 2025-04-23 | End: 2025-04-23 | Stop reason: HOSPADM

## 2025-04-23 RX ORDER — FENTANYL CITRATE 50 UG/ML
INJECTION, SOLUTION INTRAMUSCULAR; INTRAVENOUS PRN
Status: DISCONTINUED | OUTPATIENT
Start: 2025-04-23 | End: 2025-04-23 | Stop reason: HOSPADM

## 2025-04-23 RX ORDER — AMIODARONE HYDROCHLORIDE 200 MG/1
200 TABLET ORAL 2 TIMES DAILY
Status: DISCONTINUED | OUTPATIENT
Start: 2025-04-23 | End: 2025-04-24 | Stop reason: HOSPADM

## 2025-04-23 RX ORDER — COLCHICINE 0.6 MG/1
0.6 TABLET ORAL DAILY PRN
Status: DISCONTINUED | OUTPATIENT
Start: 2025-04-23 | End: 2025-04-24 | Stop reason: HOSPADM

## 2025-04-23 RX ORDER — SODIUM CHLORIDE 0.9 % (FLUSH) 0.9 %
5-40 SYRINGE (ML) INJECTION PRN
Status: DISCONTINUED | OUTPATIENT
Start: 2025-04-23 | End: 2025-04-24 | Stop reason: HOSPADM

## 2025-04-23 RX ORDER — CHLORAL HYDRATE 500 MG
1 CAPSULE ORAL DAILY
Status: DISCONTINUED | OUTPATIENT
Start: 2025-04-24 | End: 2025-04-23 | Stop reason: RX

## 2025-04-23 RX ORDER — SODIUM CHLORIDE 0.9 % (FLUSH) 0.9 %
5-40 SYRINGE (ML) INJECTION EVERY 12 HOURS SCHEDULED
Status: DISCONTINUED | OUTPATIENT
Start: 2025-04-23 | End: 2025-04-24 | Stop reason: HOSPADM

## 2025-04-23 RX ORDER — SODIUM CHLORIDE 9 MG/ML
INJECTION, SOLUTION INTRAVENOUS CONTINUOUS
Status: DISCONTINUED | OUTPATIENT
Start: 2025-04-23 | End: 2025-04-23 | Stop reason: HOSPADM

## 2025-04-23 RX ORDER — ASCORBIC ACID 500 MG
250 TABLET ORAL DAILY
Status: DISCONTINUED | OUTPATIENT
Start: 2025-04-24 | End: 2025-04-24 | Stop reason: HOSPADM

## 2025-04-23 RX ORDER — DILTIAZEM HYDROCHLORIDE 120 MG/1
240 CAPSULE, COATED, EXTENDED RELEASE ORAL DAILY
Status: DISCONTINUED | OUTPATIENT
Start: 2025-04-24 | End: 2025-04-24 | Stop reason: HOSPADM

## 2025-04-23 RX ORDER — HEPARIN SODIUM 1000 [USP'U]/ML
INJECTION, SOLUTION INTRAVENOUS; SUBCUTANEOUS PRN
Status: DISCONTINUED | OUTPATIENT
Start: 2025-04-23 | End: 2025-04-23 | Stop reason: HOSPADM

## 2025-04-23 RX ORDER — CLOPIDOGREL BISULFATE 75 MG/1
75 TABLET ORAL DAILY
Status: DISCONTINUED | OUTPATIENT
Start: 2025-04-24 | End: 2025-04-24 | Stop reason: HOSPADM

## 2025-04-23 RX ORDER — ALLOPURINOL 100 MG/1
100 TABLET ORAL DAILY
Status: DISCONTINUED | OUTPATIENT
Start: 2025-04-23 | End: 2025-04-24 | Stop reason: HOSPADM

## 2025-04-23 RX ORDER — SODIUM CHLORIDE 0.9 % (FLUSH) 0.9 %
5-40 SYRINGE (ML) INJECTION PRN
Status: DISCONTINUED | OUTPATIENT
Start: 2025-04-23 | End: 2025-04-23 | Stop reason: HOSPADM

## 2025-04-23 RX ORDER — ATORVASTATIN CALCIUM 20 MG/1
40 TABLET, FILM COATED ORAL NIGHTLY
Status: DISCONTINUED | OUTPATIENT
Start: 2025-04-23 | End: 2025-04-24 | Stop reason: HOSPADM

## 2025-04-23 RX ADMIN — ALLOPURINOL 100 MG: 100 TABLET ORAL at 20:27

## 2025-04-23 RX ADMIN — SODIUM CHLORIDE, PRESERVATIVE FREE 10 ML: 5 INJECTION INTRAVENOUS at 20:28

## 2025-04-23 RX ADMIN — SODIUM CHLORIDE: 0.9 INJECTION, SOLUTION INTRAVENOUS at 12:24

## 2025-04-23 RX ADMIN — ATORVASTATIN CALCIUM 40 MG: 20 TABLET, FILM COATED ORAL at 20:26

## 2025-04-23 RX ADMIN — AMIODARONE HYDROCHLORIDE 200 MG: 200 TABLET ORAL at 20:26

## 2025-04-23 RX ADMIN — CEFAZOLIN 2000 MG: 1 INJECTION, POWDER, FOR SOLUTION INTRAMUSCULAR; INTRAVENOUS at 17:44

## 2025-04-23 ASSESSMENT — ENCOUNTER SYMPTOMS
CHEST TIGHTNESS: 1
SHORTNESS OF BREATH: 1

## 2025-04-23 NOTE — H&P
[facility]  Cardiology H&P      Requesting MD:  Filipe Hassan MD   Admit Status:         History obtained from:   [x] Patient  [] Other (specify):     Patient:  Jeff Zamudio  [id number]     Chief Complaint: No chief complaint on file.    Complete heart block during cardiac catheterization    HPI: Mr. Zamudio is a 79 y.o. male with a history of permanent atrial fibrillation on beta-blocker and calcium channel blocker, suspected paradoxical low-flow low gradient moderate to severe aortic stenosis who is admitted to the ICU after requiring transvenous pacemaker due to being completely pacemaker dependent while undergoing cardiac catheterization.  He was undergoing left and right heart catheterization today as part of his aortic stenosis workup.  A right heart catheterization could not be performed as he required an emergent pacemaker placement via the femoral venous line.    While the patient is diagnostic cardiac catheterization was being performed, he went into complete heart block with heart rate in the 30s requiring an amp of atropine followed by an emergent placement of a temporary venous pacemaker.  He did not recover from his complete heart block thus we placed a temporary venous pacemaker with a set rate of 60 bpm.    His cardiac catheterization demonstrated severe mid circumflex lesion which underwent successful PCI with no complications.    He was loaded with Plavix 300 mg post PCI.    He will be admitted to the ICU.  Will wait for beta-blocker and Cardizem washout in order to assess whether his complete heart block is predominantly due to to AV erica blockers.  If his complete heart block does not improve then we will consider permanent pacemaker.    Review of Systems:  Review of Systems   Constitutional:  Negative for diaphoresis.   Respiratory:  Positive for chest tightness and shortness of breath.    Cardiovascular:  Negative for palpitations and leg swelling.  comments:     quit smoking 30 yrs ago   Vaping Use    Vaping status: Never Used   Substance and Sexual Activity    Alcohol use: Not Currently    Drug use: Not Currently    Sexual activity: Not Currently   Other Topics Concern    Not on file   Social History Narrative    Not on file     Social Drivers of Health     Financial Resource Strain: Not on file   Food Insecurity: No Food Insecurity (3/27/2025)    Hunger Vital Sign     Worried About Running Out of Food in the Last Year: Never true     Ran Out of Food in the Last Year: Never true   Transportation Needs: No Transportation Needs (3/27/2025)    PRAPARE - Transportation     Lack of Transportation (Medical): No     Lack of Transportation (Non-Medical): No   Physical Activity: Not on file   Stress: Not on file   Social Connections: Not on file   Intimate Partner Violence: Not on file   Housing Stability: Low Risk  (3/27/2025)    Housing Stability Vital Sign     Unable to Pay for Housing in the Last Year: No     Number of Times Moved in the Last Year: 0     Homeless in the Last Year: No       Allergies:  No Known Allergies    Home Meds:  Prior to Admission medications    Medication Sig Start Date End Date Taking? Authorizing Provider   amiodarone (CORDARONE) 200 MG tablet Take 1 tablet by mouth 2 times daily 3/28/25  Yes Amy Barriga MD   atorvastatin (LIPITOR) 40 MG tablet Take 1 tablet by mouth nightly 3/28/25  Yes Amy Barriga MD   clopidogrel (PLAVIX) 75 MG tablet Take 1 tablet by mouth daily 3/29/25  Yes Amy Barriga MD   Ascorbic Acid (VITAMIN C) 250 MG tablet Take 1 tablet by mouth daily   Yes Eris Dhillon MD   Omega-3 1000 MG CAPS Take 1 capsule by mouth daily   Yes Eris Dhillon MD   allopurinol (ZYLOPRIM) 100 MG tablet Take 1 tablet by mouth daily Patient takes half a tablet   Yes ProviderEris MD   losartan (COZAAR) 50 MG tablet Take 0.5 tablets by mouth daily   Yes Eris Dhillon MD   dilTIAZem

## 2025-04-24 VITALS
SYSTOLIC BLOOD PRESSURE: 137 MMHG | BODY MASS INDEX: 28.63 KG/M2 | RESPIRATION RATE: 18 BRPM | WEIGHT: 200 LBS | HEART RATE: 107 BPM | OXYGEN SATURATION: 96 % | HEIGHT: 70 IN | DIASTOLIC BLOOD PRESSURE: 87 MMHG | TEMPERATURE: 97.2 F

## 2025-04-24 PROCEDURE — 6370000000 HC RX 637 (ALT 250 FOR IP): Performed by: INTERNAL MEDICINE

## 2025-04-24 PROCEDURE — G0378 HOSPITAL OBSERVATION PER HR: HCPCS

## 2025-04-24 PROCEDURE — 94760 N-INVAS EAR/PLS OXIMETRY 1: CPT

## 2025-04-24 RX ADMIN — OXYCODONE HYDROCHLORIDE AND ACETAMINOPHEN 250 MG: 500 TABLET ORAL at 08:18

## 2025-04-24 RX ADMIN — CLOPIDOGREL BISULFATE 75 MG: 75 TABLET, FILM COATED ORAL at 08:18

## 2025-04-24 RX ADMIN — ALLOPURINOL 100 MG: 100 TABLET ORAL at 08:18

## 2025-04-24 RX ADMIN — DILTIAZEM HYDROCHLORIDE 240 MG: 120 CAPSULE, COATED, EXTENDED RELEASE ORAL at 08:18

## 2025-04-24 RX ADMIN — LOSARTAN POTASSIUM 25 MG: 50 TABLET, FILM COATED ORAL at 08:18

## 2025-04-24 RX ADMIN — AMIODARONE HYDROCHLORIDE 200 MG: 200 TABLET ORAL at 08:21

## 2025-04-24 RX ADMIN — PANTOPRAZOLE SODIUM 40 MG: 40 TABLET, DELAYED RELEASE ORAL at 06:09

## 2025-04-24 NOTE — CARE COORDINATION
04/24/25 0931   IMM Letter   Observation Status Letter date given: 04/24/25   Observation Status Letter time given: 0905   Observation Status Letter given to Patient/Family/Significant other/Guardian/POA/by: Sergei LOPEZ letter given to patient/family with oral explanation and questions addressed by:     Signed letter placed in pt chart.   Electronically signed by Sergei Rivera on 4/24/2025 at 9:33 AM

## 2025-04-24 NOTE — CARE COORDINATION
04/24/25 0753   Readmission Assessment   Number of Days since last admission? 8-30 days   Previous Disposition Home Alone   Who is being Interviewed Patient   What was the patient's/caregiver's perception as to why they think they needed to return back to the hospital? Other (Comment)  (complete heart block)   Did you visit your Primary Care Physician after you left the hospital, before you returned this time? No   Why weren't you able to visit your PCP? Did not have an appointment   Did you see a specialist, such as Cardiac, Pulmonary, Orthopedic Physician, etc. after you left the hospital? Yes   Who advised the patient to return to the hospital? Self-referral   Does the patient report anything that got in the way of taking their medications? No   In our efforts to provide the best possible care to you and others like you, can you think of anything that we could have done to help you after you left the hospital the first time, so that you might not have needed to return so soon? Other (Comment)  (nothing)     Electronically signed by Yary Gaitan on 4/24/2025 at 7:57 AM

## 2025-04-24 NOTE — PROGRESS NOTES
PHARMACY NOTE  Jeff Zamudio was ordered fish oil. Per the [facility] Formulary Committee, this medication is non-formulary and not stocked by pharmacy.  It has been discontinued. The medication can be reordered at discharge.     Electronically signed by Camilo Muse RPH on 4/23/2025 at 7:34 PM

## 2025-04-25 NOTE — TELEPHONE ENCOUNTER
Call received from Fernanda with PFT lab to inform patient has rescheduled appointment for PFT study to the date of 05/22/2025. Patient has appointment with Dr. Barriga on 4/30/2025.

## 2025-05-12 DIAGNOSIS — I35.0 SEVERE AORTIC STENOSIS: Primary | ICD-10-CM

## 2025-05-13 ENCOUNTER — TELEPHONE (OUTPATIENT)
Dept: CARDIOLOGY CLINIC | Age: 80
End: 2025-05-13

## (undated) DEVICE — KIT ANGIO W/ AT P65 PREM HND CTRL FOR CNTRST DEL ANGIOTOUCH

## (undated) DEVICE — CANNULA SEAL

## (undated) DEVICE — GUIDEWIRE VASC J 3 MM 0.035 INX260 CM 10 CM PTFE SS STRT

## (undated) DEVICE — GENERAL LAP CDS

## (undated) DEVICE — CATHETER GUID 6FR L100CM DIA0.071IN NYL SHFT EBU3.5

## (undated) DEVICE — TISSUE RETRIEVAL SYSTEM: Brand: INZII RETRIEVAL SYSTEM

## (undated) DEVICE — ENDO KIT,LOURDES HOSPITAL: Brand: MEDLINE INDUSTRIES, INC.

## (undated) DEVICE — ROYAL SILK SURGICAL GOWN, XXL: Brand: CONVERTORS

## (undated) DEVICE — BLADE LARYNGOSCOPE MILLER 2

## (undated) DEVICE — CLIP INT L POLYMER LOK LIG HEM O LOK

## (undated) DEVICE — Device

## (undated) DEVICE — GLIDESHEATH SLENDER STAINLESS STEEL KIT: Brand: GLIDESHEATH SLENDER

## (undated) DEVICE — BLADE MAC GRN LT DISPOSABLE

## (undated) DEVICE — CATHETER DIAG 6FR L100CM LUMN ID0.056IN JR4 CRV 0 SIDE H

## (undated) DEVICE — PINNACLE INTRODUCER SHEATH: Brand: PINNACLE

## (undated) DEVICE — TUBING CNTRST DEL NO SYR HI PRSS INJ FEM LUER LCK ROT ADPT

## (undated) DEVICE — SYSTEM BX CAP BILI RAP EXCHG CAP LOK DEV COMPATIBLE W/ OLY

## (undated) DEVICE — PERCUTANEOUS ENTRY THINWALL NEEDLE  ONE-PART: Brand: COOK

## (undated) DEVICE — ADHESIVE SKIN CLSR 0.7ML TOP DERMBND ADV

## (undated) DEVICE — CUFF BLD PRESSURE 1 TUBE AD 25-34 CM ARM VLY FLEXIPORT DISP

## (undated) DEVICE — LO-CONTOUR ORAL/NASAL TRACHEAL TUBE CUFFED,MURPHY EYE: Brand: MALLINCKRODT

## (undated) DEVICE — TUBE ET 7.5MM NSL ORAL BASIC CUF INTMED MURPHY EYE RADPQ

## (undated) DEVICE — SPHINCTEROTOME: Brand: DREAMTOME™ RX 44

## (undated) DEVICE — INFLATION DEVICE KIT: Brand: ENCORE™ 26 ADVANTAGE KIT

## (undated) DEVICE — BLADELESS OBTURATOR: Brand: WECK VISTA

## (undated) DEVICE — COVER LT HNDL BLU PLAS

## (undated) DEVICE — GLOVE ORTHO 8   MSG9480

## (undated) DEVICE — SOLUTION IV IRRIG POUR BRL 0.9% SODIUM CHL 2F7124

## (undated) DEVICE — GUIDEWIRE VASC L260CM DIA0.035IN TIP L7CM PTFE S STL STR

## (undated) DEVICE — Device: Brand: ASAHI SION BLUE

## (undated) DEVICE — CURAVIEW LED LARYNSCP BLDE

## (undated) DEVICE — NEEDLE INSUF L120MM ULT VERES ENDOPATH

## (undated) DEVICE — SNARE ENDOSCP L240CM LOOP W13MM SHTH DIA2.4MM SM OVL FLX

## (undated) DEVICE — CATH BLLN ANGIO 2.50X15MM SC EUPHORA RX

## (undated) DEVICE — BAND COMPR L24CM REG CLR PLAS HEMSTAT EXT HK AND LOOP RETEN

## (undated) DEVICE — KIT MFLD ISOLATN NACL CNTRST PRT TBNG SPIK W/ PRSS TRNSDUC

## (undated) DEVICE — ARM DRAPE

## (undated) DEVICE — DRAPE,UTILITY,XL,4/PK,STERILE: Brand: MEDLINE

## (undated) DEVICE — BLANKET WRM W29.9XL79.1IN UP BODY FORC AIR MISTRAL-AIR

## (undated) DEVICE — CATHETER DUAL LUMEN LANGSTON 6F L110CM GWIRE 0.038IN 1000PSI

## (undated) DEVICE — ANESTHESIA CIRCUIT ADULT-LF: Brand: MEDLINE INDUSTRIES, INC.

## (undated) DEVICE — RETRIEVAL BALLOON CATHETER: Brand: EXTRACTOR™ PRO RX

## (undated) DEVICE — GUIDEWIRE VASC L260CM TIP L5CM 0.25FR STR RND TIP TUNGSTEN

## (undated) DEVICE — Device: Brand: NOMOLINE™ LH ADULT NASAL CO2 CANNULA WITH O2 4M

## (undated) DEVICE — SUTURE VCRL SZ 0 L27IN ABSRB VLT L36MM UR-5 5/8 CIR J376H

## (undated) DEVICE — CATHETER PRESSURE WEDGE BLLN 6FRX110CM

## (undated) DEVICE — CATHETER COR DIAG JUDKINS L 3.5 CRV 6FR 100CM 0 SIDE H

## (undated) DEVICE — DILATOR COONS TAPER: Brand: COONS

## (undated) DEVICE — COVER,MAYO STAND,STERILE: Brand: MEDLINE

## (undated) DEVICE — CONTRAST IOTHALAMATE MEGLUMINE 60% 50 ML INJ CONRAY 60

## (undated) DEVICE — SUTURE MCRYL SZ 4-0 L18IN ABSRB UD L19MM PS-2 3/8 CIR PRIM Y496G

## (undated) DEVICE — GLOVE SURG SZ 7 CRM LTX FREE POLYISOPRENE POLYMER BEAD ANTI

## (undated) DEVICE — COPILOT BLEEDBACK CONTROL VALVE: Brand: COPILOT